# Patient Record
Sex: FEMALE | Race: WHITE | NOT HISPANIC OR LATINO | Employment: OTHER
[De-identification: names, ages, dates, MRNs, and addresses within clinical notes are randomized per-mention and may not be internally consistent; named-entity substitution may affect disease eponyms.]

---

## 2021-02-18 ENCOUNTER — TELEPHONE (OUTPATIENT)
Dept: MEDICAL GROUP | Facility: PHYSICIAN GROUP | Age: 86
End: 2021-02-18

## 2021-02-18 NOTE — TELEPHONE ENCOUNTER
NEW PATIENT VISIT PRE-VISIT PLANNING    1.  EpicCare Patient is checked in Patient Demographics?Yes    2.  Immunizations were updated in Epic using Reconcile Outside Information activity? Yes         3.  Is this appointment scheduled as a Hospital Follow-Up? No    4.  Patient is due for the following Health Maintenance Topics:   Health Maintenance Due   Topic Date Due   • COVID-19 Vaccine (1 of 2) 04/03/1944   • IMM DTaP/Tdap/Td Vaccine (1 - Tdap) 04/03/1947   • PAP SMEAR  04/03/1949   • MAMMOGRAM  04/03/1968   • COLONOSCOPY  04/03/1978   • IMM ZOSTER VACCINES (1 of 2) 04/03/1978   • BONE DENSITY  04/03/1993   • IMM PNEUMOCOCCAL VACCINE: 65+ Years (1 of 1 - PPSV23) 04/03/1993   • IMM INFLUENZA (1) 09/01/2020       5.  Reviewed/Updated the following with patient:       •   Preferred Pharmacy? yes       •   Preferred Lab? yes       •   Preferred Communication? yes       •   Allergies? yes       •   Medications? yes       •   Social History? yes       •   Family History (document living status of immediate family members and if + hx of  cancer, diabetes, hypertension, hyperlipidemia, heart attack, stroke) No    6.  Updated Care Team?       •   DME Company (gait device, O2, CPAP, etc.) N/A       •   Other Specialists (eye doctor, derm, GYN, cardiology, endo, etc): N?A    7.  AHA (Puls[employer]) form printed for Provider? Email sent to [facility] requesting form

## 2021-02-24 ENCOUNTER — OFFICE VISIT (OUTPATIENT)
Dept: MEDICAL GROUP | Facility: PHYSICIAN GROUP | Age: 86
End: 2021-02-24
Payer: MEDICARE

## 2021-02-24 VITALS
OXYGEN SATURATION: 96 % | DIASTOLIC BLOOD PRESSURE: 70 MMHG | SYSTOLIC BLOOD PRESSURE: 140 MMHG | HEIGHT: 71 IN | WEIGHT: 163.1 LBS | TEMPERATURE: 97.5 F | BODY MASS INDEX: 22.83 KG/M2 | HEART RATE: 120 BPM

## 2021-02-24 DIAGNOSIS — R63.4 WEIGHT LOSS: ICD-10-CM

## 2021-02-24 DIAGNOSIS — I48.11 LONGSTANDING PERSISTENT ATRIAL FIBRILLATION (HCC): ICD-10-CM

## 2021-02-24 DIAGNOSIS — H51.8: ICD-10-CM

## 2021-02-24 DIAGNOSIS — G89.29 CHRONIC RIGHT-SIDED LOW BACK PAIN WITHOUT SCIATICA: ICD-10-CM

## 2021-02-24 DIAGNOSIS — Z00.00 HEALTHCARE MAINTENANCE: ICD-10-CM

## 2021-02-24 DIAGNOSIS — H91.93 BILATERAL HEARING LOSS, UNSPECIFIED HEARING LOSS TYPE: ICD-10-CM

## 2021-02-24 DIAGNOSIS — R26.81 GAIT INSTABILITY: ICD-10-CM

## 2021-02-24 DIAGNOSIS — Z71.89 ADVANCE DIRECTIVE DISCUSSED WITH PATIENT: ICD-10-CM

## 2021-02-24 DIAGNOSIS — M54.50 CHRONIC RIGHT-SIDED LOW BACK PAIN WITHOUT SCIATICA: ICD-10-CM

## 2021-02-24 DIAGNOSIS — R00.0 TACHYCARDIA: ICD-10-CM

## 2021-02-24 DIAGNOSIS — Z00.00 WELLNESS EXAMINATION: ICD-10-CM

## 2021-02-24 DIAGNOSIS — Z85.828 HISTORY OF SKIN CANCER: ICD-10-CM

## 2021-02-24 PROBLEM — H91.90 HARD OF HEARING: Status: ACTIVE | Noted: 2021-02-24

## 2021-02-24 PROBLEM — I48.91 ATRIAL FIBRILLATION (HCC): Status: ACTIVE | Noted: 2021-02-24

## 2021-02-24 PROCEDURE — 99204 OFFICE O/P NEW MOD 45 MIN: CPT | Performed by: FAMILY MEDICINE

## 2021-02-24 RX ORDER — ATENOLOL 25 MG/1
25 TABLET ORAL DAILY
Qty: 30 TABLET | Refills: 5 | Status: SHIPPED | OUTPATIENT
Start: 2021-02-24 | End: 2021-04-30 | Stop reason: SDUPTHER

## 2021-02-24 ASSESSMENT — PATIENT HEALTH QUESTIONNAIRE - PHQ9: CLINICAL INTERPRETATION OF PHQ2 SCORE: 0

## 2021-02-24 NOTE — ASSESSMENT & PLAN NOTE
Decades of R LBP, no radiation, no known hx trauma, occurs all day every day, improves w/ sitting, movement helps. Often a 6-7/10, not present right now. Doesn't want to take anything for this. Hx R hip bursitis and had PT then which helped. Has tried PT for her LBP in the past which didn't help.

## 2021-02-24 NOTE — PATIENT INSTRUCTIONS
Let me know if you want a blood thinner like xarelto  We would need to check your kidney function prior with a lab test  Let me know if you want to see cardiology  Enjoy PT  Try 1/2 dose or whole dose of atenolol for better exercise tolerance with walking

## 2021-02-24 NOTE — ASSESSMENT & PLAN NOTE
Moved to [place] 1/[place]. [place] lived [place] for 30y, was in a [facility] home (had her own appt, meals were served) but in 11/20 moved to live with her daughter (was isolated bc of Covid) however that didn't turn out as expected so she moved here to be closer to her daughter Melissa Dixon and lives in a [facility] home here ([facility]). Needs POLST form and HC placard form today.

## 2021-02-24 NOTE — ASSESSMENT & PLAN NOTE
"Has been given short acting and long acting metoprolol in the past, she only takes it if her heart is \"pounding hard.\" hasn't taken it in 2 yrs. Has been recommended to start anticoagulation but she has deferred.   "

## 2021-02-24 NOTE — ASSESSMENT & PLAN NOTE
Lost 20lb due to covid, in part she was lonely and she didn't like the food at her longterm home. Has been a vegetarian for 60 yrs.

## 2021-02-24 NOTE — PROGRESS NOTES
"Subjective:     CC:    Chief Complaint   Patient presents with   • Establish Care     New Patient        HISTORY OF THE PRESENT ILLNESS: Patient is a 89 y.o. female. This pleasant patient is here today to establish care and discuss her routine medical problems, she has no c/o today. [place].    Healthcare maintenance  [place] 1/21 [place]. [place] lived [place] for 30[place], [facility] (had her own appt, meals were served) but in 11/20 moved to live with her daughter (was isolated bc of Covid) however that didn't turn out as expected so she moved here to be closer to her daughter Melissa Dixon and lives in a [facility] home here ([facility]). Needs POLST form and HC placard form today.     Atrial fibrillation (HCC)  Has been given short acting and long acting metoprolol in the past, she only takes it if her heart is \"pounding hard.\" hasn't taken it in 2 yrs. Has been recommended to start anticoagulation but she has deferred.     Right-sided low back pain without sciatica  Decades of R LBP, no radiation, no known hx trauma, occurs all day every day, improves w/ sitting, movement helps. Often a 6-7/10, not present right now. Doesn't want to take anything for this. Hx R hip bursitis and had PT then which helped. Has tried PT for her LBP in the past which didn't help.     Hard of hearing  Wears aides.     Advance directive discussed with patient  Patient here with daughter Melissa Dixon. Has another daughter who she believes is her POA Minal Beth in [place]. She has a POLST form from [place] signed 4/17/17 that she is DNR/DNI and Comfort Measures only. She wants to continue with this and the [place] POLST form was signed today with me. She would be willing to take medicines orally only if they make her more comfortable. She does not want life prolonging medications.     History of skin cancer  Hx BCC, would like a referral to derm.     Weight loss  Lost 20lb due to covid, in part she was lonely and she didn't like " "the food at her [facility] home. Has been a vegetarian for 60 yrs.     Skew deviation of right eye  Deviates upper/lateral. Told by her optho there is nothing to do about it, unsure of etiology.       Allergies: Patient has no known allergies.    Current Outpatient Medications Ordered in Epic   Medication Sig Dispense Refill   • atenolol (TENORMIN) 25 MG Tab Take 1 tablet by mouth every day. 30 tablet 5     No current Epic-ordered facility-administered medications on file.       No past medical history on file.    No past surgical history on file.    Social History     Tobacco Use   • Smoking status: Former Smoker     Packs/day: 0.50     Years: 3.00     Pack years: 1.50     Quit date: 1952     Years since quittin.1   • Smokeless tobacco: Never Used   Substance Use Topics   • Alcohol use: Never   • Drug use: Never       Social History     Social History Narrative   • Not on file       Family History   Problem Relation Age of Onset   • Heart Disease Father    • Hyperlipidemia Father    • Heart Attack Paternal Grandfather        Health Maintenance: Completed    ROS:   See HPI      Objective:     Exam:   /70 (BP Location: Right arm, Patient Position: Sitting, BP Cuff Size: Adult)   Pulse (!) 120   Temp 36.4 °C (97.5 °F) (Temporal)   Ht 1.803 m (5' 11\")   Wt 74 kg (163 lb 1.6 oz)   SpO2 96%   BMI 22.75 kg/m²   Body mass index is 22.75 kg/m².  Wt Readings from Last 4 Encounters:   21 74 kg (163 lb 1.6 oz)     General: Normal appearing. No distress. Appropriately groomed.  HEENT: Normocephalic. Eyes conjunctiva clear lids without ptosis, pupils equal and reactive to light accommodation, R eye deviated superior/lateral, ears normal shape and contour, canals are clear bilaterally, tympanic membranes are benign, nasal mucosa benign, oropharynx is without erythema, edema or exudates.   Neck: Supple without JVD or bruit. Thyroid is not enlarged.   Pulmonary: Clear to ausculation.  Normal effort. No " rales, ronchi, or wheezing.  Cardiovascular: Regular rate and rhythm without murmur. Carotid and radial pulses are intact and equal bilaterally.  Abdomen: Soft, nontender, nondistended. Normal bowel sounds. Liver and spleen are not palpable  Neurologic: Grossly nonfocal  Lymph: No cervical or supraclavicular lymph nodes are palpable  Skin: Warm and dry.  No obvious lesions.  Musculoskeletal: abNormal gait. No extremity cyanosis, clubbing, or edema.  I watched her walk up and down her clinic hallway.  Her gait is mildly unstable, she seems to have weakness in her right gluteus medius as her right leg swings a little medially unintentionally.  She is not able to stand up without the use of her hands.  Psych: Normal mood and affect. Alert and oriented x3. Judgment and insight is normal.      Assessment & Plan:   89 y.o. female with the following -    I spent a long time talking with the patient and her daughter about her CHADS2 score and risk of stroke due to her untreated A. fib and choosing not to use anticoagulation.  I worked to understand what her goals are and her preferences include being treated at home as much as possible and avoiding ER visits and hospital stays if possible.  She wanted to continue to be comfort measures only on her POLST form but stated that if she had a hip fracture she would likely want to have it repaired surgically due to improved outcomes with aggressive treatment.    She does not want an echocardiogram at this time and does not want to start anticoagulation but she is willing to try a low-dose of a beta-blocker as it might improve her exercise tolerance with walking with her daughter which is something she enjoys.  She will let me know if she has any problems with this.  She did want a referral to see the dermatologist due to her prior skin cancers.  She would like to see me back in 6 months and an appointment was arranged.  I filled out her POLST form and had it scanned into the  chart.    1. Tachycardia  - EKG - Clinic Performed    2. Healthcare maintenance    3. Longstanding persistent atrial fibrillation (HCC)    4. Chronic right-sided low back pain without sciatica    5. Bilateral hearing loss, unspecified hearing loss type    6. Advance directive discussed with patient    7. History of skin cancer  - REFERRAL TO DERMATOLOGY    8. Weight loss    9. Skew deviation of right eye    10. Gait instability  - REFERRAL TO PHYSICAL THERAPY    Other orders  - atenolol (TENORMIN) 25 MG Tab; Take 1 tablet by mouth every day.  Dispense: 30 tablet; Refill: 5       Return in about 6 months (around 8/24/2021).    Please note that this dictation was created using voice recognition software. I have made every reasonable attempt to correct obvious errors, but I expect that there are errors of grammar and possibly content that I did not discover before finalizing the note.

## 2021-02-24 NOTE — ASSESSMENT & PLAN NOTE
Patient here with daughter Melissa Dixon. Has another daughter who she believes is her POA Minal Beth in [place]. She has a POLST form from [place] signed 4/17/17 that she is DNR/DNI and Comfort Measures only. She wants to continue with this and the [place] POLST form was signed today with me. She would be willing to take medicines orally only if they make her more comfortable. She does not want life prolonging medications.

## 2021-04-30 ENCOUNTER — HOSPITAL ENCOUNTER (OUTPATIENT)
Facility: MEDICAL CENTER | Age: 86
End: 2021-04-30
Attending: FAMILY MEDICINE
Payer: MEDICARE

## 2021-04-30 ENCOUNTER — OFFICE VISIT (OUTPATIENT)
Dept: MEDICAL GROUP | Facility: PHYSICIAN GROUP | Age: 86
End: 2021-04-30
Payer: MEDICARE

## 2021-04-30 VITALS
DIASTOLIC BLOOD PRESSURE: 64 MMHG | SYSTOLIC BLOOD PRESSURE: 110 MMHG | RESPIRATION RATE: 15 BRPM | WEIGHT: 160.9 LBS | HEIGHT: 71 IN | HEART RATE: 88 BPM | BODY MASS INDEX: 22.52 KG/M2 | OXYGEN SATURATION: 95 % | TEMPERATURE: 97.6 F

## 2021-04-30 DIAGNOSIS — R79.9 ABNORMAL FINDING OF BLOOD CHEMISTRY, UNSPECIFIED: ICD-10-CM

## 2021-04-30 DIAGNOSIS — H57.11 PAIN OF RIGHT EYE: ICD-10-CM

## 2021-04-30 DIAGNOSIS — H51.8: ICD-10-CM

## 2021-04-30 DIAGNOSIS — K59.00 CONSTIPATION, UNSPECIFIED CONSTIPATION TYPE: ICD-10-CM

## 2021-04-30 DIAGNOSIS — I48.11 LONGSTANDING PERSISTENT ATRIAL FIBRILLATION (HCC): ICD-10-CM

## 2021-04-30 DIAGNOSIS — M79.671 RIGHT FOOT PAIN: ICD-10-CM

## 2021-04-30 DIAGNOSIS — Z11.59 NEED FOR HEPATITIS C SCREENING TEST: ICD-10-CM

## 2021-04-30 PROCEDURE — 83036 HEMOGLOBIN GLYCOSYLATED A1C: CPT

## 2021-04-30 PROCEDURE — 82607 VITAMIN B-12: CPT

## 2021-04-30 PROCEDURE — 82746 ASSAY OF FOLIC ACID SERUM: CPT

## 2021-04-30 PROCEDURE — 85025 COMPLETE CBC W/AUTO DIFF WBC: CPT

## 2021-04-30 PROCEDURE — 83550 IRON BINDING TEST: CPT

## 2021-04-30 PROCEDURE — 83540 ASSAY OF IRON: CPT

## 2021-04-30 PROCEDURE — 86803 HEPATITIS C AB TEST: CPT

## 2021-04-30 PROCEDURE — 80053 COMPREHEN METABOLIC PANEL: CPT

## 2021-04-30 PROCEDURE — 84443 ASSAY THYROID STIM HORMONE: CPT

## 2021-04-30 PROCEDURE — 99214 OFFICE O/P EST MOD 30 MIN: CPT | Performed by: FAMILY MEDICINE

## 2021-04-30 PROCEDURE — 82728 ASSAY OF FERRITIN: CPT

## 2021-04-30 RX ORDER — ATENOLOL 25 MG/1
12.5 TABLET ORAL DAILY
Qty: 45 TABLET | Refills: 5
Start: 2021-04-30 | End: 2021-12-02

## 2021-04-30 NOTE — NON-PROVIDER
Pt was seated, confirmed pt name and , procedure explained to pt, venipuncture performed in RAC, using aseptic technique, 4 gold, 1 green, 2 lavender tubes collected, gauze placed with pressure on venipuncture site until hemostasis observed, site clean and dry, no redness observed. Slight swelling (1mm), monitored for 5 minutes, no increase. Advised pt to apply ice if painful. Pressure bandage placed, pt tolerated well and voiced no concerns.

## 2021-04-30 NOTE — PROGRESS NOTES
"Subjective:     CC:   Chief Complaint   Patient presents with   • Foot Pain     Outside of the right foot, Pain for 6 weeks    • Eye Problem     Experiencing pain in her right eye          HPI:   Daphne presents today with :  A few days after a PT session on her back (felt like a massage to help loosen her muscles she experienced a severe pain in the outside of her R foot.  Felt like she had been zapped by a live wire, lasted a few minutes. Can't do anything to help it, has recurred about every couple of days. Might be 1 to a few times each episode.  Doesn't feel like a muscle cramp.   She let her PT know who checked her muscles and didn't see anything abnormal. Also, doesn't have any issues in her legs.   Problem   Constipation    Uses miralax prn and adds fiber to her cereal daily. Ok to use miralax daily.      Pain of Right Eye    Has had pain in this eye for \"quite awhile,\" daughter thinks for 10yr or so. Needs a local optho. No new vision or eye complaints.      Atrial Fibrillation (Hcc)    Has deferred any treatment of this and understands the risk of clots/stroke. I offered aspirin or xarelto/eliquis and she deferred. Has also deferred a referral to cardiology.   Stable, continue current plan of care       Skew Deviation of Right Eye    Has a long h/o this. Doesn't know why, has had it evaluated by prior optho. Hx cataract in that eye.          Current Outpatient Medications Ordered in Epic   Medication Sig Dispense Refill   • atenolol (TENORMIN) 25 MG Tab Take 0.5 Tablets by mouth every day. 45 tablet 5     No current Epic-ordered facility-administered medications on file.       No past medical history on file.    Social History     Tobacco Use   • Smoking status: Former Smoker     Packs/day: 0.50     Years: 3.00     Pack years: 1.50     Quit date: 1952     Years since quittin.3   • Smokeless tobacco: Never Used   Substance Use Topics   • Alcohol use: Never   • Drug use: Never " "      Allergies:  Patient has no known allergies.    Health Maintenance: Completed    ROS:  Per HPI      Objective:       Exam:  /64 (BP Location: Left arm, Patient Position: Sitting, BP Cuff Size: Adult)   Pulse 88   Temp 36.4 °C (97.6 °F) (Temporal)   Resp 15   Ht 1.803 m (5' 11\")   Wt 73 kg (160 lb 14.4 oz)   SpO2 95%   BMI 22.44 kg/m²   Body mass index is 22.44 kg/m².  Wt Readings from Last 4 Encounters:   04/30/21 73 kg (160 lb 14.4 oz)   02/24/21 74 kg (163 lb 1.6 oz)       Gen: Alert and oriented, No apparent distress. Appropriately groomed.  Neck: Neck is supple without lymphadenopathy.No thyromegaly.   Lungs: Normal effort, CTA bilaterally, no wheezes, rhonchi, or rales.  CV: irRegular rate and rhythm. No murmurs, rubs, or gallops.  ABD:  Soft, nontender, nondistended, NABSx4, no HSM or RBT or guarding or masses.  Ext: No clubbing, cyanosis, L>R 1+ pitting pretib edema. Bilateral ankles w/ dry skin, wnl ROM, no ttp. No ankle instability.   Skin: No rash noted.  Monofilament testing with a 10 gram force: sensation intact: intact bilaterally  Visual Inspection: Feet without maceration, ulcers, fissures.  Pedal pulses: intact bilaterally    Labs: obtain now, non fasting    Assessment & Plan:     89 y.o. female with the following -   Defers referral to podiatry  rec compression hose for bilat LE edema  defers cardiology and u/s of LLE for eval to r/o DVT  Add miralax for constipation  Obtain last optho records, saline eye gtt prn    1. Constipation, unspecified constipation type    2. Longstanding persistent atrial fibrillation (HCC)  - TSH; Future    3. Right foot pain  - Comp Metabolic Panel; Future  - HEMOGLOBIN A1C; Future  - VITAMIN B12; Future  - FOLATE; Future  - FERRITIN; Future  - IRON/TOTAL IRON BIND; Future  - CBC WITH DIFFERENTIAL; Future    4. Need for hepatitis C screening test  - HEP C VIRUS ANTIBODY; Future    5. Abnormal finding of blood chemistry, unspecified   - HEMOGLOBIN A1C; " Future  - FERRITIN; Future  - IRON/TOTAL IRON BIND; Future    6. Skew deviation of right eye  - REFERRAL TO OPHTHALMOLOGY    7. Pain of right eye  - REFERRAL TO OPHTHALMOLOGY    Other orders  - atenolol (TENORMIN) 25 MG Tab; Take 0.5 Tablets by mouth every day.  Dispense: 45 tablet; Refill: 5        Please note that this dictation was created using voice recognition software. I have made every reasonable attempt to correct obvious errors, but I expect that there are errors of grammar and possibly content that I did not discover before finalizing the note.

## 2021-05-01 LAB
ALBUMIN SERPL BCP-MCNC: 4.3 G/DL (ref 3.2–4.9)
ALBUMIN/GLOB SERPL: 1.5 G/DL
ALP SERPL-CCNC: 65 U/L (ref 30–99)
ALT SERPL-CCNC: 19 U/L (ref 2–50)
ANION GAP SERPL CALC-SCNC: 8 MMOL/L (ref 7–16)
AST SERPL-CCNC: 28 U/L (ref 12–45)
BASOPHILS # BLD AUTO: 0.8 % (ref 0–1.8)
BASOPHILS # BLD: 0.06 K/UL (ref 0–0.12)
BILIRUB SERPL-MCNC: 0.5 MG/DL (ref 0.1–1.5)
BUN SERPL-MCNC: 40 MG/DL (ref 8–22)
CALCIUM SERPL-MCNC: 9.5 MG/DL (ref 8.5–10.5)
CHLORIDE SERPL-SCNC: 104 MMOL/L (ref 96–112)
CO2 SERPL-SCNC: 24 MMOL/L (ref 20–33)
CREAT SERPL-MCNC: 1.29 MG/DL (ref 0.5–1.4)
EOSINOPHIL # BLD AUTO: 0.2 K/UL (ref 0–0.51)
EOSINOPHIL NFR BLD: 2.7 % (ref 0–6.9)
ERYTHROCYTE [DISTWIDTH] IN BLOOD BY AUTOMATED COUNT: 48.7 FL (ref 35.9–50)
EST. AVERAGE GLUCOSE BLD GHB EST-MCNC: 111 MG/DL
FERRITIN SERPL-MCNC: 167 NG/ML (ref 10–291)
FOLATE SERPL-MCNC: 15.1 NG/ML
GLOBULIN SER CALC-MCNC: 2.8 G/DL (ref 1.9–3.5)
GLUCOSE SERPL-MCNC: 81 MG/DL (ref 65–99)
HBA1C MFR BLD: 5.5 % (ref 4–5.6)
HCT VFR BLD AUTO: 41.8 % (ref 37–47)
HCV AB SER QL: NORMAL
HGB BLD-MCNC: 13.3 G/DL (ref 12–16)
IMM GRANULOCYTES # BLD AUTO: 0.04 K/UL (ref 0–0.11)
IMM GRANULOCYTES NFR BLD AUTO: 0.5 % (ref 0–0.9)
IRON SATN MFR SERPL: 33 % (ref 15–55)
IRON SERPL-MCNC: 102 UG/DL (ref 40–170)
LYMPHOCYTES # BLD AUTO: 2.31 K/UL (ref 1–4.8)
LYMPHOCYTES NFR BLD: 31.7 % (ref 22–41)
MCH RBC QN AUTO: 30 PG (ref 27–33)
MCHC RBC AUTO-ENTMCNC: 31.8 G/DL (ref 33.6–35)
MCV RBC AUTO: 94.4 FL (ref 81.4–97.8)
MONOCYTES # BLD AUTO: 0.63 K/UL (ref 0–0.85)
MONOCYTES NFR BLD AUTO: 8.6 % (ref 0–13.4)
NEUTROPHILS # BLD AUTO: 4.05 K/UL (ref 2–7.15)
NEUTROPHILS NFR BLD: 55.7 % (ref 44–72)
NRBC # BLD AUTO: 0 K/UL
NRBC BLD-RTO: 0 /100 WBC
PLATELET # BLD AUTO: 230 K/UL (ref 164–446)
PMV BLD AUTO: 12.4 FL (ref 9–12.9)
POTASSIUM SERPL-SCNC: 4.8 MMOL/L (ref 3.6–5.5)
PROT SERPL-MCNC: 7.1 G/DL (ref 6–8.2)
RBC # BLD AUTO: 4.43 M/UL (ref 4.2–5.4)
SODIUM SERPL-SCNC: 136 MMOL/L (ref 135–145)
TIBC SERPL-MCNC: 309 UG/DL (ref 250–450)
TSH SERPL DL<=0.005 MIU/L-ACNC: 4.14 UIU/ML (ref 0.38–5.33)
UIBC SERPL-MCNC: 207 UG/DL (ref 110–370)
VIT B12 SERPL-MCNC: 572 PG/ML (ref 211–911)
WBC # BLD AUTO: 7.3 K/UL (ref 4.8–10.8)

## 2021-05-04 ENCOUNTER — TELEPHONE (OUTPATIENT)
Dept: MEDICAL GROUP | Facility: PHYSICIAN GROUP | Age: 86
End: 2021-05-04

## 2021-05-04 DIAGNOSIS — M79.671 RIGHT FOOT PAIN: ICD-10-CM

## 2021-05-04 DIAGNOSIS — N28.9 RENAL INSUFFICIENCY: ICD-10-CM

## 2021-05-04 NOTE — TELEPHONE ENCOUNTER
1. Caller Name: Becky Dixon                         Call Back Number: [phone] (home)       How would the patient prefer to be contacted with a response: Phone call OK to leave a detailed message    Patients Daughter called and asked if Mom could get a referral to Podiatry for Dr. Avila

## 2021-05-04 NOTE — TELEPHONE ENCOUNTER
Excruciating Pain in her Right foot. Started 6 Weeks Ago   Says pain happens out of know where and that she doesn't do anything to cause it.

## 2021-05-06 ENCOUNTER — PATIENT MESSAGE (OUTPATIENT)
Dept: MEDICAL GROUP | Facility: PHYSICIAN GROUP | Age: 86
End: 2021-05-06

## 2021-05-06 ENCOUNTER — TELEPHONE (OUTPATIENT)
Dept: MEDICAL GROUP | Facility: PHYSICIAN GROUP | Age: 86
End: 2021-05-06

## 2021-05-06 NOTE — TELEPHONE ENCOUNTER
Thank you, I would reiterate what I told her already in my [employer] message.  If she has any old lab work that she can submit either bringing us a copy faxing it or submitting it via [employer] that would be helpful.  Otherwise I have no new information.  If she wants to come see me in the office to discuss it further she is welcome to.

## 2021-05-06 NOTE — TELEPHONE ENCOUNTER
1. Caller Name: Daphne Moss                         Call Back Number: [phone] (home)       How would the patient prefer to be contacted with a response: Phone call do NOT leave a detailed message    Patient LVM regarding Lab work for Comp Metabolic Panel. Patient is concerned with her BUN levels. States shes usually at 20-27 and is concerned as to why it's 40. Wants to know if its a safe range? Also her GFR was out of range and would like more information on it.

## 2021-05-12 ENCOUNTER — PATIENT MESSAGE (OUTPATIENT)
Dept: HEALTH INFORMATION MANAGEMENT | Facility: OTHER | Age: 86
End: 2021-05-12

## 2021-05-15 NOTE — PATIENT COMMUNICATION
Phone Number Called: [phone] (home)       Call outcome: Spoke to patient regarding message below.    Message: Notified pt that problems regarding MyChart and medical record reconciliation can be discussed at next appt.    Pt stated that her last Opthamologist was Dr Nicholas Bateman at [facility] in [place], [place] (Phone #: [phone]).

## 2021-05-15 NOTE — TELEPHONE ENCOUNTER
Phone Number Called: [phone]    Call outcome:  Practice closed    Message: Attempted to call [facility] to confirm pt service(s), but automated response stated that clinic had closed at 5.

## 2021-05-18 ENCOUNTER — PATIENT MESSAGE (OUTPATIENT)
Dept: ADMINISTRATIVE | Facility: MEDICAL CENTER | Age: 86
End: 2021-05-18

## 2021-06-16 ENCOUNTER — PATIENT MESSAGE (OUTPATIENT)
Dept: HEALTH INFORMATION MANAGEMENT | Facility: OTHER | Age: 86
End: 2021-06-16

## 2021-07-26 ENCOUNTER — OFFICE VISIT (OUTPATIENT)
Dept: URGENT CARE | Facility: CLINIC | Age: 86
End: 2021-07-26
Payer: MEDICARE

## 2021-07-26 VITALS
TEMPERATURE: 98.5 F | RESPIRATION RATE: 18 BRPM | OXYGEN SATURATION: 96 % | HEIGHT: 71 IN | WEIGHT: 150 LBS | DIASTOLIC BLOOD PRESSURE: 74 MMHG | SYSTOLIC BLOOD PRESSURE: 116 MMHG | HEART RATE: 83 BPM | BODY MASS INDEX: 21 KG/M2

## 2021-07-26 DIAGNOSIS — L97.321 VENOUS STASIS ULCER OF LEFT ANKLE LIMITED TO BREAKDOWN OF SKIN WITHOUT VARICOSE VEINS (HCC): ICD-10-CM

## 2021-07-26 DIAGNOSIS — I87.2 VENOUS STASIS ULCER OF LEFT ANKLE LIMITED TO BREAKDOWN OF SKIN WITHOUT VARICOSE VEINS (HCC): ICD-10-CM

## 2021-07-26 PROBLEM — M18.11 ARTHRITIS OF CARPOMETACARPAL (CMC) JOINT OF RIGHT THUMB: Status: ACTIVE | Noted: 2018-06-28

## 2021-07-26 PROBLEM — Z87.891 FORMER SMOKER: Status: ACTIVE | Noted: 2017-07-13

## 2021-07-26 PROBLEM — M54.31 RIGHT SIDED SCIATICA: Status: ACTIVE | Noted: 2020-11-03

## 2021-07-26 PROBLEM — M85.80 OSTEOPENIA: Status: ACTIVE | Noted: 2018-05-31

## 2021-07-26 PROBLEM — N28.9 RENAL INSUFFICIENCY SYNDROME: Status: ACTIVE | Noted: 2018-05-31

## 2021-07-26 PROCEDURE — 99203 OFFICE O/P NEW LOW 30 MIN: CPT | Performed by: PHYSICIAN ASSISTANT

## 2021-07-26 ASSESSMENT — ENCOUNTER SYMPTOMS
COUGH: 0
VOMITING: 0
SHORTNESS OF BREATH: 0
HEADACHES: 0
EYE DISCHARGE: 0
FEVER: 0
SORE THROAT: 0
NAUSEA: 0
EYE REDNESS: 0

## 2021-07-26 ASSESSMENT — FIBROSIS 4 INDEX: FIB4 SCORE: 2.6

## 2021-07-26 NOTE — PROGRESS NOTES
Subjective:      Daphne Moss is a 89 y.o. female who presents with Leg Injury (x this morning L leg above foot leaking clear fluid)          This is a new problem.  The patient presents to clinic accompanied by her daughter complaining of weeping to the left lower leg/ankle onset earlier today.  The patient states that she noticed an area of weeping to the left lower leg near the left ankle.  The patient reports no recent injury or trauma to her left lower leg.  She reports no active bleeding.  The patient describes the weeping as clear fluid.  The patient reports a history of swelling to her bilateral lower extremities, especially the bilateral feet and ankles.  The patient states the swelling is often worse in the summer due to the heat.  The patient reports no increased swelling of her left lower extremity.  The patient reports no pain to the affected area.  She also reports no pain/tenderness to the left lower extremity, specifically the left calf.  The patient reports no associated redness.  No increased warmth.  She also reports no associated fever.  The patient states she is feeling well otherwise.  The patient reports no history of same.  The patient has not taken any OTC medications for her current symptoms.      PMH:  has no past medical history on file.  MEDS:   Current Outpatient Medications:   •  atenolol (TENORMIN) 25 MG Tab, Take 0.5 Tablets by mouth every day., Disp: 45 tablet, Rfl: 5  •  Cholecalciferol 1000 UNIT Cap, Take 1,000 Units by mouth every day., Disp: , Rfl:   ALLERGIES: No Known Allergies  SURGHX: No past surgical history on file.  SOCHX:  reports that she quit smoking about 69 years ago. She has a 1.50 pack-year smoking history. She has never used smokeless tobacco. She reports that she does not drink alcohol and does not use drugs.  FH: Family history was reviewed, no pertinent findings to report    Review of Systems   Constitutional: Negative for fever.   HENT: Negative for congestion,  "ear pain and sore throat.    Eyes: Negative for discharge and redness.   Respiratory: Negative for cough and shortness of breath.    Cardiovascular: Negative for chest pain and leg swelling.   Gastrointestinal: Negative for nausea and vomiting.   Musculoskeletal: Negative for joint pain.   Skin: Negative for rash.   Neurological: Negative for headaches.          Objective:     /74 (BP Location: Left arm, Patient Position: Sitting, BP Cuff Size: Adult)   Pulse 83   Temp 36.9 °C (98.5 °F) (Temporal)   Resp 18   Ht 1.803 m (5' 11\")   Wt 68 kg (150 lb)   SpO2 96%   BMI 20.92 kg/m²      Physical Exam  Constitutional:       General: She is not in acute distress.     Appearance: Normal appearance. She is well-developed. She is not ill-appearing.   HENT:      Head: Normocephalic and atraumatic.      Right Ear: External ear normal.      Left Ear: External ear normal.   Eyes:      Extraocular Movements: Extraocular movements intact.      Conjunctiva/sclera: Conjunctivae normal.   Cardiovascular:      Rate and Rhythm: Normal rate.   Pulmonary:      Effort: Pulmonary effort is normal.   Musculoskeletal:      Cervical back: Normal range of motion and neck supple.      Comments:   Left Lower Extremity:   Single venous stasis ulcer to the distal anterior aspect of the left lower leg/ankle with active weeping of serous fluid.  Venous stasis changes to the left lower leg.  No tenderness to palpation surrounding the ulcer.  No tenderness to the left lower leg.  No calf tenderness.  Trace swelling to the left ankle.  No swelling to the left lower leg.  No palpable cords.  No erythema surrounding the venous stasis ulcer.  No increased warmth.  No purulent discharge/drainage.  No secondary signs of infection.  No erythema to the left lower leg.  No increased warmth.  No additional wounds/lesions.  ROM intact -the patient demonstrates full active range of motion of the left ankle/foot  Neurovascular intact distally  Strength " 5/5 -dorsiflexion/plantarflexion of the left ankle/foot against resistance  Normal gait   Skin:     General: Skin is warm and dry.   Neurological:      Mental Status: She is alert and oriented to person, place, and time.              Progress:  Applied a dressing to the patient's venous stasis ulcer with an Ace wrap providing slight compression to the left lower leg.  Educated the patient on proper wound care.  Advised patient to monitor for signs of infection.     Assessment/Plan:          1. Venous stasis ulcer of left ankle limited to breakdown of skin without varicose veins (HCC)    The patient's presenting symptoms and physical exam findings are consistent with a venous stasis ulcer of the left ankle.  The patient presents to clinic due to continued weeping of clear fluid to the left ankle.  On physical exam, the patient had a single venous stasis ulcer to the distal anterior aspect of the left lower leg/ankle with active weeping of serous fluid.  The patient had chronic venous stasis changes to the left lower leg.  The patient had no tenderness to palpation surrounding the ulcer.  The patient also had no tenderness to the left lower leg, specifically no calf tenderness.  Additionally, the patient had trace swelling to the left ankle likely secondary to dependent edema.  The patient had no diffuse swelling of the left lower leg.  No palpable cords were appreciated.  The patient had no signs of secondary infection with no erythema, increased warmth, or purulent discharge/drainage.  The remainder the patient's physical exam today in clinic was normal.  The patient appears in no acute distress.  The patient's vital signs are stable and within normal limits.  She is afebrile today in clinic.  Based on the patient's presenting symptoms and physical exam findings, it is unlikely the patient's venous stasis ulcer is secondarily infected at this time.  Advised patient to monitor for signs of infection.  Applied a  dressing to the patient's venous stasis ulcer with an Ace wrap providing slight compression to the left lower leg.  Educated patient on proper wound care.  Recommend patient follow-up with her PCP.  Discussed return precautions with the patient, and she verbalized understanding.    Differential diagnoses, supportive care, and indications for immediate follow-up discussed with patient.   Instructed to return to clinic or nearest emergency department for any change in condition, further concerns, or worsening of symptoms.    OTC Tylenol for fever/discomfort.  RICE  --Advised the patient compression stockings can help with dependent edema to the lower extremities  Monitor for signs of infection  Follow-up with PCP  Return to clinic or go to the ED if symptoms worsen or fail to improve, or if the patient should develop worsening/increasing/persistent wound to the left lower leg/ankle, persistent weeping, pain/tenderness, swelling, increased redness or warmth, purulent discharge/drainage, fever/chills, secondary signs of infection, and/or any concerning symptoms.    Discussed plan with the patient, and she agrees to the above.     I personally reviewed prior external notes and test results pertinent to today's visit.  I have independently reviewed and interpreted all diagnostics ordered during this urgent care visit.     Time spent evaluating this patient was at least 30 minutes and includes preparing for visit, obtaining history, exam and evaluation, ordering labs/tests/procedures/medications, independent interpretation, and counseling/education.    Please note that this dictation was created using voice recognition software. I have made every reasonable attempt to correct obvious errors, but I expect that there may be errors of grammar and possibly content that I did not discover before finalizing the note.     This note was electronically signed by Iza Artis PA-C

## 2021-08-11 ENCOUNTER — OFFICE VISIT (OUTPATIENT)
Dept: MEDICAL GROUP | Facility: PHYSICIAN GROUP | Age: 86
End: 2021-08-11
Payer: MEDICARE

## 2021-08-11 VITALS
DIASTOLIC BLOOD PRESSURE: 78 MMHG | RESPIRATION RATE: 16 BRPM | SYSTOLIC BLOOD PRESSURE: 122 MMHG | TEMPERATURE: 98.5 F | BODY MASS INDEX: 21.56 KG/M2 | HEIGHT: 71 IN | HEART RATE: 98 BPM | WEIGHT: 154 LBS | OXYGEN SATURATION: 95 %

## 2021-08-11 DIAGNOSIS — K59.00 CONSTIPATION, UNSPECIFIED CONSTIPATION TYPE: ICD-10-CM

## 2021-08-11 DIAGNOSIS — M54.50 CHRONIC RIGHT-SIDED LOW BACK PAIN WITHOUT SCIATICA: ICD-10-CM

## 2021-08-11 DIAGNOSIS — G89.29 CHRONIC RIGHT-SIDED LOW BACK PAIN WITHOUT SCIATICA: ICD-10-CM

## 2021-08-11 DIAGNOSIS — Z71.89 ADVANCE DIRECTIVE DISCUSSED WITH PATIENT: ICD-10-CM

## 2021-08-11 DIAGNOSIS — I48.11 LONGSTANDING PERSISTENT ATRIAL FIBRILLATION (HCC): ICD-10-CM

## 2021-08-11 PROCEDURE — 99212 OFFICE O/P EST SF 10 MIN: CPT | Performed by: FAMILY MEDICINE

## 2021-08-11 ASSESSMENT — FIBROSIS 4 INDEX: FIB4 SCORE: 2.6

## 2021-08-11 NOTE — PROGRESS NOTES
Subjective:     CC:   Chief Complaint   Patient presents with   • Back Pain     px is not getting any better          HPI:   Daphne presents today with follow-up from our last April 30 visit  At that time I suggested she try compression hose for her bilateral lower extremity edema.  She had deferred a referral to cardiology or an ultrasound of her left lower extremity to rule out a DVT.  She had described severe pain on the outside of her right foot after PT session that felt like an exact byline fire and lasted a few minutes, I have not found anything abnormal on her exam and she deferred a referral to podiatry.   I also suggested she add MiraLAX for constipation. Her constipation has resolved and she hasnt had any further issues w/  Her foot. Continues w/ back pain on occasion, hinders her from walking longer distances, did PT twice w/o help. Doesn't take anything.   Has gained 4lb since last visit in EMR 7/26.   Has mild mac degen. Affects vision for small print.     Problem   Atrial Fibrillation (Hcc)    Has deferred any treatment of this and understands the risk of clots/stroke. I offered aspirin or xarelto/eliquis and she deferred. Has also deferred a referral to cardiology.   We re-visited the issue 08/11/21 and again I explained to her the risk of stroke by not taking a blood thinner and she expresses understanding but continues to defer a rx or eval. She states she doesn't like taking pills.        Advance Directive Discussed With Patient    Patient here with daughter Melissa Dixon. Has another daughter who she believes is her POA M[place] Beth in [place]. She has a [place] form from [place] signed 4/17/17 that she is DNR/DNI and Comfort Measures only. She wants to continue with this and the [place] POLST form was signed 2/21 with me. She would be willing to take medicines orally only if they make her more comfortable. She does not want life prolonging medications.          Current Outpatient Medications Ordered in Epic  "  Medication Sig Dispense Refill   • atenolol (TENORMIN) 25 MG Tab Take 0.5 Tablets by mouth every day. 45 tablet 5   • Cholecalciferol 1000 UNIT Cap Take 1,000 Units by mouth every day.       No current [facility]-ordered facility-administered medications on file.       History reviewed. No pertinent past medical history.    Social History     Tobacco Use   • Smoking status: Former Smoker     Packs/day: 0.50     Years: 3.00     Pack years: 1.50     Quit date: 1952     Years since quittin.6   • Smokeless tobacco: Never Used   Vaping Use   • Vaping Use: Never used   Substance Use Topics   • Alcohol use: Never   • Drug use: Never       Allergies:  Patient has no known allergies.    Health Maintenance: Completed  Had tdap when broke her arm around 12-14y ago. Defers that.   As well as pcv 23 or shingrix.   ROS:  Per HPI  Energy is fine, no cp/sob/cough. No abd pain. No falls.       Objective:       Exam:  /78 (BP Location: Left arm, Patient Position: Sitting, BP Cuff Size: Adult)   Pulse 98   Temp 36.9 °C (98.5 °F) (Temporal)   Resp 16   Ht 1.803 m (5' 11\")   Wt 69.9 kg (154 lb)   SpO2 95%   BMI 21.48 kg/m²   Body mass index is 21.48 kg/m².  Wt Readings from Last 4 Encounters:   21 69.9 kg (154 lb)   21 68 kg (150 lb)   21 73 kg (160 lb 14.4 oz)   21 74 kg (163 lb 1.6 oz)       Gen: Alert and oriented, No apparent distress. Appropriately groomed.  Neck: Neck is supple without lymphadenopathy.No thyromegaly.   Lungs: Normal effort, CTA bilaterally, no wheezes, rhonchi, or rales.  CV: irRegular rhythm, normal rate. No murmurs, rubs, or gallops.  ABD:  Soft, nontender, nondistended, NABSx4, no HSM or RBT or guarding or masses.  Ext: No clubbing, cyanosis, edema.  Skin: No rash noted.    Results for orders placed or performed during the hospital encounter of 21   CBC WITH DIFFERENTIAL   Result Value Ref Range    WBC 7.3 4.8 - 10.8 K/uL    RBC 4.43 4.20 - 5.40 M/uL    Hemoglobin " 13.3 12.0 - 16.0 g/dL    Hematocrit 41.8 37.0 - 47.0 %    MCV 94.4 81.4 - 97.8 fL    MCH 30.0 27.0 - 33.0 pg    MCHC 31.8 (L) 33.6 - 35.0 g/dL    RDW 48.7 35.9 - 50.0 fL    Platelet Count 230 164 - 446 K/uL    MPV 12.4 9.0 - 12.9 fL    Neutrophils-Polys 55.70 44.00 - 72.00 %    Lymphocytes 31.70 22.00 - 41.00 %    Monocytes 8.60 0.00 - 13.40 %    Eosinophils 2.70 0.00 - 6.90 %    Basophils 0.80 0.00 - 1.80 %    Immature Granulocytes 0.50 0.00 - 0.90 %    Nucleated RBC 0.00 /100 WBC    Neutrophils (Absolute) 4.05 2.00 - 7.15 K/uL    Lymphs (Absolute) 2.31 1.00 - 4.80 K/uL    Monos (Absolute) 0.63 0.00 - 0.85 K/uL    Eos (Absolute) 0.20 0.00 - 0.51 K/uL    Baso (Absolute) 0.06 0.00 - 0.12 K/uL    Immature Granulocytes (abs) 0.04 0.00 - 0.11 K/uL    NRBC (Absolute) 0.00 K/uL   IRON/TOTAL IRON BIND   Result Value Ref Range    Iron 102 40 - 170 ug/dL    Total Iron Binding 309 250 - 450 ug/dL    Unsat Iron Binding 207 110 - 370 ug/dL    % Saturation 33 15 - 55 %   FERRITIN   Result Value Ref Range    Ferritin 167.0 10.0 - 291.0 ng/mL   FOLATE   Result Value Ref Range    Folate -Folic Acid 15.1 >4.0 ng/mL   HEMOGLOBIN A1C   Result Value Ref Range    Glycohemoglobin 5.5 4.0 - 5.6 %    Est Avg Glucose 111 mg/dL   TSH   Result Value Ref Range    TSH 4.140 0.380 - 5.330 uIU/mL   Comp Metabolic Panel   Result Value Ref Range    Sodium 136 135 - 145 mmol/L    Potassium 4.8 3.6 - 5.5 mmol/L    Chloride 104 96 - 112 mmol/L    Co2 24 20 - 33 mmol/L    Anion Gap 8.0 7.0 - 16.0    Glucose 81 65 - 99 mg/dL    Bun 40 (H) 8 - 22 mg/dL    Creatinine 1.29 0.50 - 1.40 mg/dL    Calcium 9.5 8.5 - 10.5 mg/dL    AST(SGOT) 28 12 - 45 U/L    ALT(SGPT) 19 2 - 50 U/L    Alkaline Phosphatase 65 30 - 99 U/L    Total Bilirubin 0.5 0.1 - 1.5 mg/dL    Albumin 4.3 3.2 - 4.9 g/dL    Total Protein 7.1 6.0 - 8.2 g/dL    Globulin 2.8 1.9 - 3.5 g/dL    A-G Ratio 1.5 g/dL   VITAMIN B12   Result Value Ref Range    Vitamin B12 -True Cobalamin 574 926 - 151  pg/mL   HEP C VIRUS ANTIBODY   Result Value Ref Range    Hepatitis C Antibody Non-Reactive Non-Reactive   ESTIMATED GFR   Result Value Ref Range    GFR If  47 (A) >60 mL/min/1.73 m 2    GFR If Non  39 (A) >60 mL/min/1.73 m 2   compared to8/18/21 gfr 39.     Assessment & Plan:     89 y.o. female with the following -   ckd stable, no need to repeat.   She defers blood thinner  Can add tylenol  1. Chronic right-sided low back pain without sciatica    2. Longstanding persistent atrial fibrillation (HCC)    3. Constipation, unspecified constipation type    4. Advance directive discussed with patient        I spent a total of 17 minutes with record review, exam, communication with the patient, communication with other providers, and documentation of this encounter.      Return in about 6 months (around 2/11/2022) for AWV.    Please note that this dictation was created using voice recognition software. I have made every reasonable attempt to correct obvious errors, but I expect that there are errors of grammar and possibly content that I did not discover before finalizing the note.

## 2021-12-02 RX ORDER — ATENOLOL 25 MG/1
TABLET ORAL
Qty: 100 TABLET | Refills: 3 | Status: SHIPPED | OUTPATIENT
Start: 2021-12-02 | End: 2023-06-01 | Stop reason: SDUPTHER

## 2021-12-21 ENCOUNTER — TELEPHONE (OUTPATIENT)
Dept: HEALTH INFORMATION MANAGEMENT | Facility: OTHER | Age: 86
End: 2021-12-21

## 2021-12-29 ENCOUNTER — APPOINTMENT (OUTPATIENT)
Dept: MEDICAL GROUP | Facility: PHYSICIAN GROUP | Age: 86
End: 2021-12-29
Payer: MEDICARE

## 2022-02-10 ENCOUNTER — OFFICE VISIT (OUTPATIENT)
Dept: MEDICAL GROUP | Facility: PHYSICIAN GROUP | Age: 87
End: 2022-02-10
Payer: MEDICARE

## 2022-02-10 VITALS
DIASTOLIC BLOOD PRESSURE: 70 MMHG | WEIGHT: 152.4 LBS | SYSTOLIC BLOOD PRESSURE: 122 MMHG | HEIGHT: 71 IN | HEART RATE: 80 BPM | OXYGEN SATURATION: 96 % | TEMPERATURE: 96.3 F | BODY MASS INDEX: 21.34 KG/M2 | RESPIRATION RATE: 16 BRPM

## 2022-02-10 DIAGNOSIS — I48.11 LONGSTANDING PERSISTENT ATRIAL FIBRILLATION (HCC): ICD-10-CM

## 2022-02-10 DIAGNOSIS — Z71.89 ADVANCE DIRECTIVE DISCUSSED WITH PATIENT: ICD-10-CM

## 2022-02-10 DIAGNOSIS — Z00.00 MEDICARE ANNUAL WELLNESS VISIT, SUBSEQUENT: Primary | ICD-10-CM

## 2022-02-10 PROCEDURE — G0439 PPPS, SUBSEQ VISIT: HCPCS | Performed by: FAMILY MEDICINE

## 2022-02-10 ASSESSMENT — ACTIVITIES OF DAILY LIVING (ADL): BATHING_REQUIRES_ASSISTANCE: 0

## 2022-02-10 ASSESSMENT — FIBROSIS 4 INDEX: FIB4 SCORE: 2.6

## 2022-02-10 ASSESSMENT — PATIENT HEALTH QUESTIONNAIRE - PHQ9: CLINICAL INTERPRETATION OF PHQ2 SCORE: 0

## 2022-02-10 NOTE — PROGRESS NOTES
Annual Health Assessment Questions:    1.  Are you currently engaging in any exercise or physical activity? No    2.  How would you describe your mood or emotional well-being today? good    3.  Have you had any falls in the last year? No    4.  Have you noticed any problems with your balance or had difficulty walking? Yes    5.  In the last six months have you experienced any leakage of urine? No    6. DPA/Advanced Directive: Patient has Advanced Directive on file.      Chief Complaint   Patient presents with   • Annual Wellness Visit       HPI:  Daphne Moss is a 89 y.o. here for Medicare Annual Wellness Visit, here w/ her dtr.  Problem   Atrial Fibrillation (Hcc)    Has deferred any treatment of this and understands the risk of clots/stroke. I offered aspirin or xarelto/eliquis and she deferred. Has also deferred a referral to cardiology.   We re-visited the issue 08/11/21 and again I explained to her the risk of stroke by not taking a blood thinner and she expresses understanding but continues to defer a rx or eval. She states she doesn't like taking pills.   Stable, continue current plan of care with current medications.        Advance Directive Discussed With Patient    Patient here with daughter Melissa Dixon. Has another daughter who she believes is her POA Minal Beth in [place]. She has a POLST form from [place] signed 4/17/17 that she is DNR/DNI and Comfort Measures only. She wants to continue with this and the [place] POLST form was signed 2/21 with me. She would be willing to take medicines orally only if they make her more comfortable. She does not want life prolonging medications           Patient Active Problem List    Diagnosis Date Noted   • Constipation 04/30/2021   • Pain of right eye 04/30/2021   • Healthcare maintenance 02/24/2021   • Atrial fibrillation (HCC) 02/24/2021   • Right-sided low back pain without sciatica 02/24/2021   • Hard of hearing 02/24/2021   • Advance directive discussed with patient  02/24/2021   • History of skin cancer 02/24/2021   • Weight loss 02/24/2021   • Skew deviation of right eye 02/24/2021   • Right sided sciatica 11/03/2020   • Arthritis of carpometacarpal (CMC) joint of right thumb 06/28/2018   • Osteopenia 05/31/2018   • Renal insufficiency syndrome 05/31/2018   • BMI 25.0-25.9,adult 07/13/2017   • Former smoker 07/13/2017       Current Outpatient Medications   Medication Sig Dispense Refill   • Non Formulary Request José Manuel 2 for macular degenration     • atenolol (TENORMIN) 25 MG Tab Take 1 tablet by mouth once daily 100 Tablet 3   • Cholecalciferol 1000 UNIT Cap Take 1,000 Units by mouth every day.       No current facility-administered medications for this visit.          Current supplements as per medication list.     Allergies: Patient has no known allergies.    Current social contact/activities: spend time outdoors     She  reports that she quit smoking about 70 years ago. She has a 1.50 pack-year smoking history. She has never used smokeless tobacco. She reports that she does not drink alcohol and does not use drugs.  Counseling given: Not Answered      DPA/Advanced Directive:  Patient has Advanced Directive on file.     ROS:    Gait: Uses no assistive device  Ostomy: No  Other tubes: No  Amputations: No  Chronic oxygen use: No  Last eye exam: 1/2022  Wears hearing aids: Yes   : Denies any urinary leakage during the last 6 months    Screening:  Annual  Depression Screening    Little interest or pleasure in doing things?  0 - not at all  Feeling down, depressed , or hopeless? 0 - not at all  Patient Health Questionnaire Score: 0     If depressive symptoms identified deferred to follow up visit unless specifically addressed in assessment and plan.    Interpretation of PHQ-9 Total Score   Score Severity   1-4 No Depression   5-9 Mild Depression   10-14 Moderate Depression   15-19 Moderately Severe Depression   20-27 Severe Depression    Screening for Cognitive  Impairment    Three Minute Recall (captain, garden, picture) 1/3    Siva clock face with all 12 numbers and set the hands to show 5 past 8.  Yes   I repeated the memory testing w/ 3 new words, at 5min she was 3/3  Cognitive concerns identified deferred for follow up unless specifically addressed in assessment and plan.    Fall Risk Assessment    Has the patient had two or more falls in the last year or any fall with injury in the last year?  No    HAS TRIED PT AND DIDN'T FEEL IT WORKED WELL. HAS A WALKING CANE, IS WORKING UP TO IT. HAS A WALKER AT HER DAUGHTER'S HOME IN [place]. DOESN'T WANT ONE.   Safety Assessment    Throw rugs on floor.  Yes  Handrails on all stairs.  Yes  Good lighting in all hallways.  Yes  Difficulty hearing.  No  Patient counseled about all safety risks that were identified.    Functional Assessment ADLs    Are there any barriers preventing you from cooking for yourself or meeting nutritional needs?  No.    Are there any barriers preventing you from driving safely or obtaining transportation?  No.    Are there any barriers preventing you from using a telephone or calling for help?  No.    Are there any barriers preventing you from shopping?  Yes.    Are there any barriers preventing you from taking care of your own finances?  No.    Are there any barriers preventing you from managing your medications?  No.    Are there any barriers preventing you from showering, bathing or dressing yourself?  No.    Are you currently engaging in any exercise or physical activity?  No.     What is your perception of your health?   .      Health Maintenance Summary          Overdue - IMM ZOSTER VACCINES (1 of 2) Overdue - never done    No completion history exists for this topic.          Overdue - BONE DENSITY (Every 5 Years) Overdue - never done    No completion history exists for this topic.          Overdue - IMM INFLUENZA (1) Overdue - never done    No completion history exists for this topic.           Postponed - IMM DTaP/Tdap/Td Vaccine (1 - Tdap) Postponed until 2022    No completion history exists for this topic.          Postponed - IMM PNEUMOCOCCAL VACCINE: 65+ Years (1 of 1 - PPSV23) Postponed until 2022    No completion history exists for this topic.          COVID-19 Vaccine (Series Information) Completed    10/12/2021  Imm Admin: Pfizer SARS-CoV-2 Vaccine 12+    2021  Imm Admin: Pfizer SARS-CoV-2 Vaccine    2021  Imm Admin: Pfizer SARS-CoV-2 Vaccine          Annual Wellness Visit  Completed    02/10/2022  Visit Dx: Medicare annual wellness visit, subsequent    02/10/2022  Level of Service: ANNUAL WELLNESS VISIT-INCLUDES PPPS SUBSEQUE*          IMM HEP B VACCINE (Series Information) Aged Out    No completion history exists for this topic.          IMM MENINGOCOCCAL VACCINE (MCV4) (Series Information) Aged Out    No completion history exists for this topic.          Discontinued - MAMMOGRAM  Discontinued    No completion history exists for this topic.          Discontinued - PAP SMEAR  Discontinued    No completion history exists for this topic.          Discontinued - COLORECTAL CANCER SCREENING  Discontinued    No completion history exists for this topic.                Patient Care Team:  Minal Larry M.D. as PCP - General (Family Medicine)        Social History     Tobacco Use   • Smoking status: Former Smoker     Packs/day: 0.50     Years: 3.00     Pack years: 1.50     Quit date: 1952     Years since quittin.1   • Smokeless tobacco: Never Used   Vaping Use   • Vaping Use: Never used   Substance Use Topics   • Alcohol use: Never   • Drug use: Never     Family History   Problem Relation Age of Onset   • Heart Disease Father    • Hyperlipidemia Father    • Heart Attack Paternal Grandfather      She  has no past medical history on file.   History reviewed. No pertinent surgical history.    Exam:   /70 (BP Location: Right arm, Patient Position: Sitting, BP Cuff  "Size: Adult)   Pulse 80   Temp (!) 35.7 °C (96.3 °F) (Temporal)   Resp 16   Ht 1.803 m (5' 11\")   Wt 69.1 kg (152 lb 6.4 oz)   SpO2 96%  Body mass index is 21.26 kg/m².    Hearing GOOD W/ HER HEARING AIDES.    Dentition good  Alert, oriented in no acute distress.  Eye contact is good, speech goal directed, affect calm    Assessment and Plan. The following treatment and monitoring plan is recommended:    1. Medicare annual wellness visit, subsequent    2. Advance directive discussed with patient    3. Longstanding persistent atrial fibrillation (HCC)    Other orders  - Non Formulary Request; José Manuel 2 for macular degenration    DEFERS DEXA.  Defers vaccines other than covid  Defers lab tests, were wnl 4/21.     Services suggested: No services needed at this time  Health Care Screening: Age-appropriate preventive services recommended by USPTF and ACIP covered by Medicare were discussed today. Services ordered if indicated and agreed upon by the patient.  Referrals offered: Community-based lifestyle interventions to reduce health risks and promote self-management and wellness, fall prevention, nutrition, physical activity, tobacco-use cessation, weight loss, and mental health services as per orders if indicated.    Discussion today about general wellness and lifestyle habits:    · Prevent falls and reduce trip hazards; Cautioned about securing or removing rugs.  · Have a working fire alarm and carbon monoxide detector;   · Engage in regular physical activity and social activities     Follow-up: No follow-ups on file.    "

## 2022-03-28 ENCOUNTER — PATIENT MESSAGE (OUTPATIENT)
Dept: HEALTH INFORMATION MANAGEMENT | Facility: OTHER | Age: 87
End: 2022-03-28

## 2022-05-06 ENCOUNTER — TELEPHONE (OUTPATIENT)
Dept: HEALTH INFORMATION MANAGEMENT | Facility: OTHER | Age: 87
End: 2022-05-06

## 2022-05-06 NOTE — TELEPHONE ENCOUNTER
Called to schedule COMPREHENSIVE HEALTH ASSESSMENT.   Unable to LVM, Mailbox not set up on Voicemail.

## 2022-08-09 ENCOUNTER — OFFICE VISIT (OUTPATIENT)
Dept: MEDICAL GROUP | Facility: PHYSICIAN GROUP | Age: 87
End: 2022-08-09
Payer: MEDICARE

## 2022-08-09 VITALS
BODY MASS INDEX: 21.28 KG/M2 | OXYGEN SATURATION: 95 % | SYSTOLIC BLOOD PRESSURE: 118 MMHG | WEIGHT: 152 LBS | DIASTOLIC BLOOD PRESSURE: 70 MMHG | TEMPERATURE: 97.5 F | HEIGHT: 71 IN | RESPIRATION RATE: 14 BRPM | HEART RATE: 88 BPM

## 2022-08-09 DIAGNOSIS — H61.23 EXCESSIVE CERUMEN IN BOTH EAR CANALS: ICD-10-CM

## 2022-08-09 DIAGNOSIS — Z85.828 HISTORY OF SKIN CANCER: ICD-10-CM

## 2022-08-09 DIAGNOSIS — M54.50 CHRONIC MIDLINE LOW BACK PAIN WITHOUT SCIATICA: ICD-10-CM

## 2022-08-09 DIAGNOSIS — G89.29 CHRONIC MIDLINE LOW BACK PAIN WITHOUT SCIATICA: ICD-10-CM

## 2022-08-09 DIAGNOSIS — I48.11 LONGSTANDING PERSISTENT ATRIAL FIBRILLATION (HCC): ICD-10-CM

## 2022-08-09 PROCEDURE — 99214 OFFICE O/P EST MOD 30 MIN: CPT | Performed by: FAMILY MEDICINE

## 2022-08-09 ASSESSMENT — FIBROSIS 4 INDEX: FIB4 SCORE: 2.63

## 2022-08-09 NOTE — ASSESSMENT & PLAN NOTE
History of osteopenia in past. Currently taking vitamin D3 OTC, discussed trying to do weight bearing exercises more often. Declined DEXA at this time.

## 2022-08-09 NOTE — PROGRESS NOTES
Subjective:     CC:   Chief Complaint   Patient presents with   • Establish Care     New Pt    • Back Pain     On going x years,    • Shoulder Pain     X 1 month,R, px radiates to arm        HISTORY OF THE PRESENT ILLNESS: Patient is a 89 y.o. female. This pleasant patient is here today to establish care and discuss her current medical conditions. Her prior PCP was Dr. Larry  R sided shoulder pain x 1 month. States the pain came on 1 month ago and felt like a knot and she massaged it and did not help. Worked it way down to external radiating discomfort. States her shoulder has very little discomfort but has occ. Arm pain near her bicep area. Has been modifying her chair so her arm is positioned differently when in a computer, so this has improved it.   Low back pain for many years, done PT which is not helpful. Nothing seems to help her low back pain and being sedentary helps. Walking aggravates her pain.   Denies any recent falls.     Atrial fibrillation (HCC)  Has deferred any treatment of this and understands the risk of clots/stroke. I offered aspirin or xarelto/eliquis and she deferred. Has also deferred a referral to cardiology, states she did see one when she was in [place] a few years ago.   She is aware of the risk of stroke by not taking a blood thinner and she expresses understanding but continues to defer a rx or eval. She states she doesn't like taking pills. Denies chest pain, SOB, palpitations.  Stable, continue current plan of care with current medications.       History of skin cancer  Hx of BCC on face, arms etc. Has not been seen by Derm here, referral placed in clinic    Osteopenia  History of osteopenia in past. Currently taking vitamin D3 OTC, discussed trying to do weight bearing exercises more often. Declined DEXA at this time.     Excessive cerumen in both ear canals  Getting ear lavages every 6 months due to excessive cerumen in ear canals.   Procedure: Cerumen Removal  Risks and  "benefits of procedure discussed with patient.  Cerumen removed with  lavage   Patient tolerated the procedure well  Pt educated about proper care of ear canal. Q-tip cleaning discouraged, use of debrox and warm water lavage discussed.        Current Outpatient Medications Ordered in Epic   Medication Sig Dispense Refill   • Non Formulary Request AREDS 2 for macular degenration      • atenolol (TENORMIN) 25 MG Tab Take 1 tablet by mouth once daily 100 Tablet 3   • Cholecalciferol 1000 UNIT Cap Take 1,000 Units by mouth every day.       No current [facility]-ordered facility-administered medications on file.       Health Maintenance: Completed      Objective:       Exam: /70 (BP Location: Right arm, Patient Position: Sitting, BP Cuff Size: Adult)   Pulse 88   Temp 36.4 °C (97.5 °F) (Temporal)   Resp 14   Ht 1.803 m (5' 11\")   Wt 68.9 kg (152 lb)   SpO2 95%  Body mass index is 21.2 kg/m².    Physical Exam  Vitals reviewed.   Constitutional:       General: She is not in acute distress.     Appearance: Normal appearance.   HENT:      Right Ear: Tympanic membrane, ear canal and external ear normal.      Left Ear: Tympanic membrane, ear canal and external ear normal.      Ears:      Comments: Mild-mod cerumen bilaterally  Eyes:      Conjunctiva/sclera: Conjunctivae normal.      Pupils: Pupils are equal, round, and reactive to light.   Cardiovascular:      Rate and Rhythm: Rhythm irregular.      Pulses: Normal pulses.      Comments: Reg irregular heart sounds  Pulmonary:      Effort: Pulmonary effort is normal. No respiratory distress.      Breath sounds: Normal breath sounds. No stridor. No wheezing, rhonchi or rales.   Chest:      Chest wall: No tenderness.   Abdominal:      General: Abdomen is flat. Bowel sounds are normal.   Musculoskeletal:      Right lower leg: No edema.      Left lower leg: No edema.   Neurological:      Mental Status: She is alert and oriented to person, place, and time.   Psychiatric:         " Mood and Affect: Mood normal.         Behavior: Behavior normal.          A chaperone was offered to the patient during today's exam. Chaperone name: daughter was present.        Assessment & Plan:   89 y.o. female with the following -    1. Longstanding persistent atrial fibrillation (HCC)  Chronic, stable on current regimen. Defers Cardiology or blood thinners  2. History of skin cancer  - Referral to Dermatology    3. Excessive cerumen in both ear canals  Chronic, ears lavaged in clinic. Tolerated well  4. Chronic midline low back pain without sciatica   Chronic, defers imaging or PT at this time. Discussed walking throughout the day and avoid being sedentary. If worsens will let me know and we can obtain imaging    I spent a total of 34minutes with record review, exam, communication with the patient, communication with other providers, and documentation of this encounter.    Return in about 6 months (around 2/9/2023).    Please note that this dictation was created using voice recognition software. I have made every reasonable attempt to correct obvious errors, but I expect that there are errors of grammar and possibly content that I did not discover before finalizing the note.

## 2022-08-09 NOTE — ASSESSMENT & PLAN NOTE
Getting ear lavages every 6 months due to excessive cerumen in ear canals.   Procedure: Cerumen Removal  Risks and benefits of procedure discussed with patient.  Cerumen removed with  lavage   Patient tolerated the procedure well  Pt educated about proper care of ear canal. Q-tip cleaning discouraged, use of debrox and warm water lavage discussed.

## 2022-08-09 NOTE — ASSESSMENT & PLAN NOTE
Has deferred any treatment of this and understands the risk of clots/stroke. I offered aspirin or xarelto/eliquis and she deferred. Has also deferred a referral to cardiology, states she did see one when she was in [place] a few years ago.   She is aware of the risk of stroke by not taking a blood thinner and she expresses understanding but continues to defer a rx or eval. She states she doesn't like taking pills. Denies chest pain, SOB, palpitations.  Stable, continue current plan of care with current medications.

## 2022-09-15 ENCOUNTER — OFFICE VISIT (OUTPATIENT)
Dept: DERMATOLOGY | Facility: IMAGING CENTER | Age: 87
End: 2022-09-15
Payer: MEDICARE

## 2022-09-15 DIAGNOSIS — D22.9 NEVUS: ICD-10-CM

## 2022-09-15 DIAGNOSIS — Z85.828 HISTORY OF BASAL CELL CARCINOMA: ICD-10-CM

## 2022-09-15 DIAGNOSIS — L81.4 LENTIGO: ICD-10-CM

## 2022-09-15 DIAGNOSIS — L82.1 SEBORRHEIC KERATOSIS: ICD-10-CM

## 2022-09-15 DIAGNOSIS — L85.3 XEROSIS OF SKIN: ICD-10-CM

## 2022-09-15 PROCEDURE — 99203 OFFICE O/P NEW LOW 30 MIN: CPT | Performed by: DERMATOLOGY

## 2022-09-15 NOTE — PROGRESS NOTES
CC:  Upper body Skin Exam Only    Subjective: New pt here today to establish care and have an upper body skin exam.      History of skin cancer: Yes, BCC Face 2002. On back / pt states there's multiple BCC, but not specific.  History of precancers/actinic keratoses: No  History of biopsies:Yes, Details: See Above  History of blistering/severe sunburns:No  Family history of skin cancer:No  Family history of atypical moles:No    ROS: no fevers/chills.  No cough  Relevant PMH: NC  Social: former smoker    PE: Gen:WDWN female in NAD. Skin: scalp/face/neck/chest/back/arms/hands examined with findings as noted:  -many scattered waxy papules/plaques on body  -many tan macules on torso and extremities, appearing benign    A/P: Nevi: benign appearing:  -Reviewed skin cancer detection/prevention  -RTC PRN growth/changes/concerning features    Lentigos/SKs: benign  -reassurance  -reviewed skin cancer detection/prevention    Hx of skin cancer: BCC  -cont'd sunprotection/avoidance and skin cancer surveillance  -Q 6mo-annual exam recommended; f/u suspicious lesions PRN  -prefers once annual exam by report    Reviewed management of dry skin with moisturizers and handout supplied    I have reviewed medications relevant to my specialty.

## 2022-11-02 ENCOUNTER — DOCUMENTATION (OUTPATIENT)
Dept: HEALTH INFORMATION MANAGEMENT | Facility: OTHER | Age: 87
End: 2022-11-02
Payer: MEDICARE

## 2023-01-20 ENCOUNTER — TELEPHONE (OUTPATIENT)
Dept: SCHEDULING | Facility: IMAGING CENTER | Age: 88
End: 2023-01-20

## 2023-01-23 ENCOUNTER — APPOINTMENT (OUTPATIENT)
Dept: MEDICAL GROUP | Facility: PHYSICIAN GROUP | Age: 88
End: 2023-01-23
Payer: MEDICARE

## 2023-01-24 ENCOUNTER — OFFICE VISIT (OUTPATIENT)
Dept: MEDICAL GROUP | Facility: MEDICAL CENTER | Age: 88
End: 2023-01-24
Payer: MEDICARE

## 2023-01-24 VITALS
DIASTOLIC BLOOD PRESSURE: 70 MMHG | BODY MASS INDEX: 22.19 KG/M2 | OXYGEN SATURATION: 96 % | WEIGHT: 158.51 LBS | SYSTOLIC BLOOD PRESSURE: 130 MMHG | TEMPERATURE: 97.2 F | HEIGHT: 71 IN | RESPIRATION RATE: 18 BRPM | HEART RATE: 80 BPM

## 2023-01-24 DIAGNOSIS — N18.31 STAGE 3A CHRONIC KIDNEY DISEASE: ICD-10-CM

## 2023-01-24 DIAGNOSIS — G89.29 CHRONIC RIGHT-SIDED LOW BACK PAIN WITHOUT SCIATICA: ICD-10-CM

## 2023-01-24 DIAGNOSIS — M54.50 CHRONIC RIGHT-SIDED LOW BACK PAIN WITHOUT SCIATICA: ICD-10-CM

## 2023-01-24 DIAGNOSIS — Z23 IMMUNIZATION DUE: ICD-10-CM

## 2023-01-24 DIAGNOSIS — I10 PRIMARY HYPERTENSION: ICD-10-CM

## 2023-01-24 DIAGNOSIS — H61.23 EXCESSIVE CERUMEN IN BOTH EAR CANALS: ICD-10-CM

## 2023-01-24 DIAGNOSIS — I48.11 LONGSTANDING PERSISTENT ATRIAL FIBRILLATION (HCC): ICD-10-CM

## 2023-01-24 DIAGNOSIS — M85.80 OSTEOPENIA, UNSPECIFIED LOCATION: ICD-10-CM

## 2023-01-24 DIAGNOSIS — Z00.00 ENCOUNTER FOR MEDICAL EXAMINATION TO ESTABLISH CARE: ICD-10-CM

## 2023-01-24 PROBLEM — R63.4 WEIGHT LOSS: Status: RESOLVED | Noted: 2021-02-24 | Resolved: 2023-01-24

## 2023-01-24 PROBLEM — N28.9 RENAL INSUFFICIENCY SYNDROME: Status: RESOLVED | Noted: 2018-05-31 | Resolved: 2023-01-24

## 2023-01-24 PROCEDURE — 69209 REMOVE IMPACTED EAR WAX UNI: CPT | Performed by: STUDENT IN AN ORGANIZED HEALTH CARE EDUCATION/TRAINING PROGRAM

## 2023-01-24 PROCEDURE — 99214 OFFICE O/P EST MOD 30 MIN: CPT | Mod: 25 | Performed by: STUDENT IN AN ORGANIZED HEALTH CARE EDUCATION/TRAINING PROGRAM

## 2023-01-24 RX ORDER — LIDOCAINE PAIN RELIEF 40 MG/1000MG
PATCH TOPICAL
Qty: 30 PATCH | Refills: 0 | Status: SHIPPED | OUTPATIENT
Start: 2023-01-24 | End: 2023-07-23

## 2023-01-24 ASSESSMENT — FIBROSIS 4 INDEX: FIB4 SCORE: 2.63

## 2023-01-24 ASSESSMENT — PATIENT HEALTH QUESTIONNAIRE - PHQ9: CLINICAL INTERPRETATION OF PHQ2 SCORE: 0

## 2023-01-24 NOTE — PROCEDURES
Ear Wax Removal    Date/Time: 1/24/2023 9:35 AM  Performed by: Estefania Jiménez M.D.  Authorized by: Estefania Jiménez M.D.     Anesthesia:  Local Anesthetic: none  Location details: right ear and left ear  Patient tolerance: patient tolerated the procedure well with no immediate complications  Procedure type: irrigation (Followed by curette)    Sedation:  Patient sedated: no

## 2023-01-24 NOTE — PROGRESS NOTES
Subjective:     CC:  Diagnoses of Excessive cerumen in both ear canals, Longstanding persistent atrial fibrillation (HCC), Stage 3a chronic kidney disease (HCC), Chronic right-sided low back pain without sciatica, Primary hypertension, Osteopenia, unspecified location, and Encounter for medical examination to establish care were pertinent to this visit.    HISTORY OF THE PRESENT ILLNESS: Patient is a 89 y.o. female. This pleasant patient is here today to establish care and discuss the following.    Problem   Primary Hypertension    Chronic, stable, currently on atenolol 25 mg daily.     Stage 3a Chronic Kidney Disease (Hcc)    Chronic, last checked over a year ago and GFR was between 45 and 60.  There are no previous records to compare this to.     Excessive Cerumen in Both Ear Canals    Chronic, stable, gets ear flushes every 6 months         Atrial Fibrillation (Hcc)    This is a chronic, well controlled condition.  She is on atenolol 25 mg daily for rate control.  She is not on any anticoagulation understands the risks of this.  She once again declines referral to cardiology or anticoagulation.     Chronic Right-Sided Low Back Pain Without Sciatica    This is a chronic, uncontrolled condition.  She does not like taking pain medication so is not taking anything for this at this time.  The pain is mostly on the right side of her lower back.  She denies any sciatica.  She has done physical therapy several years ago and did not feel like it was helpful.  She is interested in not on oral pain relief     Osteopenia    History of osteopenia in past.  Currently taking vitamin D supplementation.     Weight Loss (Resolved)   Renal Insufficiency Syndrome (Resolved)    Last Assessment & Plan:   Formatting of this note might be different from the original.  Mild. Stable over the last 2-3 years. Patient declines need for referral to nephrology. Continue to avoid nephrotoxins and monitor.     Bmi 25.0-25.9,Adult (Resolved)  "      ROS:   ROS      Objective:       Exam: /70 (BP Location: Left arm, Patient Position: Sitting, BP Cuff Size: Adult)   Pulse 80   Temp 36.2 °C (97.2 °F) (Temporal)   Resp 18   Ht 1.803 m (5' 11\")   Wt 71.9 kg (158 lb 8.2 oz)   SpO2 96%  Body mass index is 22.11 kg/m².    Physical Exam  Vitals reviewed.   Constitutional:       General: She is not in acute distress.     Appearance: She is not toxic-appearing.   HENT:      Head: Normocephalic and atraumatic.      Right Ear: Tympanic membrane and external ear normal. There is impacted cerumen.      Left Ear: Tympanic membrane and external ear normal. There is impacted cerumen.   Eyes:      General:         Right eye: No discharge.         Left eye: No discharge.      Extraocular Movements: Extraocular movements intact.      Conjunctiva/sclera: Conjunctivae normal.   Pulmonary:      Effort: Pulmonary effort is normal. No respiratory distress.   Skin:     General: Skin is warm and dry.   Neurological:      Mental Status: She is alert.   Psychiatric:         Mood and Affect: Mood normal.         Behavior: Behavior normal.         Thought Content: Thought content normal.         Judgment: Judgment normal.         Assessment & Plan:   89 y.o. female with the following -    1. Encounter for medical examination to establish care  History, problem list, medications and allergies reviewed.  Records requested from previous provider if applicable.    2. Excessive cerumen in both ear canals  Discussed using Debrox drops to help with impacted cerumen between cleanings.  Remove earwax today.  - Ear Wax Removal    3. Longstanding persistent atrial fibrillation (HCC)  Rate controlled today, no adjustments to medication needed, once again discussed anticoagulation or referral to cardiology.  She declines both    4. Stage 3a chronic kidney disease (HCC)  Unclear if this is new or old, repeat CMP to assess kidney function  - Comp Metabolic Panel; Future    5. Chronic " right-sided low back pain without sciatica  Discussed referral to physical therapy which patient declines.  We also discussed using Tylenol as needed and avoiding ibuprofen or other NSAIDs due to her kidney function and concern for stomach complications.  She does not like taking pills.  We discussed using lidocaine patches which she would like to try.  - Lidocaine (LIDOCAINE PAIN RELIEF) 4 % Patch; Use one patch daily as needed for pain  Dispense: 30 Patch; Refill: 0    6. Primary hypertension  Well-controlled today, continue atenolol 25 mg daily    7. Osteopenia, unspecified location  Continue vitamin D supplementation    8. Immunization due  Patient declines flu vaccine, pneumonia vaccine, COVID booster, she states that she had a tetanus shot about 8 years ago so does not think she is due for this yet.      HCC Gap Form    Diagnosis to address: I48.11 - Longstanding persistent atrial fibrillation (HCC)  Assessment and plan: Chronic, stable. Continue with current defined treatment plan: rate control only. Follow-up at least annually.  Diagnosis: I70.0 - Atherosclerosis of aorta (HCC)  Assessment and plan: Chronic, stable. Continue with current defined treatment plan: not an any medication, monitor. Follow-up at least annually.  Last edited 01/24/23 09:35 PST by Estefania Jiménez M.D.       Return in about 6 months (around 7/24/2023) for Annual.    Please note that this dictation was created using voice recognition software. I have made every reasonable attempt to correct obvious errors, but I expect that there are errors of grammar and possibly content that I did not discover before finalizing the note.

## 2023-02-22 ENCOUNTER — HOSPITAL ENCOUNTER (OUTPATIENT)
Dept: LAB | Facility: MEDICAL CENTER | Age: 88
End: 2023-02-22
Attending: STUDENT IN AN ORGANIZED HEALTH CARE EDUCATION/TRAINING PROGRAM
Payer: MEDICARE

## 2023-02-22 DIAGNOSIS — N18.31 STAGE 3A CHRONIC KIDNEY DISEASE: ICD-10-CM

## 2023-02-22 LAB
ALBUMIN SERPL BCP-MCNC: 4.1 G/DL (ref 3.2–4.9)
ALBUMIN/GLOB SERPL: 1.5 G/DL
ALP SERPL-CCNC: 68 U/L (ref 30–99)
ALT SERPL-CCNC: 14 U/L (ref 2–50)
ANION GAP SERPL CALC-SCNC: 12 MMOL/L (ref 7–16)
AST SERPL-CCNC: 24 U/L (ref 12–45)
BILIRUB SERPL-MCNC: 0.7 MG/DL (ref 0.1–1.5)
BUN SERPL-MCNC: 33 MG/DL (ref 8–22)
CALCIUM ALBUM COR SERPL-MCNC: 9.2 MG/DL (ref 8.5–10.5)
CALCIUM SERPL-MCNC: 9.3 MG/DL (ref 8.4–10.2)
CHLORIDE SERPL-SCNC: 105 MMOL/L (ref 96–112)
CO2 SERPL-SCNC: 24 MMOL/L (ref 20–33)
CREAT SERPL-MCNC: 1.18 MG/DL (ref 0.5–1.4)
FASTING STATUS PATIENT QL REPORTED: NORMAL
GFR SERPLBLD CREATININE-BSD FMLA CKD-EPI: 43 ML/MIN/1.73 M 2
GLOBULIN SER CALC-MCNC: 2.8 G/DL (ref 1.9–3.5)
GLUCOSE SERPL-MCNC: 106 MG/DL (ref 65–99)
POTASSIUM SERPL-SCNC: 4.4 MMOL/L (ref 3.6–5.5)
PROT SERPL-MCNC: 6.9 G/DL (ref 6–8.2)
SODIUM SERPL-SCNC: 141 MMOL/L (ref 135–145)

## 2023-02-22 PROCEDURE — 80053 COMPREHEN METABOLIC PANEL: CPT

## 2023-02-22 PROCEDURE — 36415 COLL VENOUS BLD VENIPUNCTURE: CPT

## 2023-06-01 ENCOUNTER — PATIENT MESSAGE (OUTPATIENT)
Dept: MEDICAL GROUP | Facility: MEDICAL CENTER | Age: 88
End: 2023-06-01
Payer: MEDICARE

## 2023-06-01 RX ORDER — ATENOLOL 25 MG/1
25 TABLET ORAL DAILY
Qty: 100 TABLET | Refills: 3 | Status: SHIPPED | OUTPATIENT
Start: 2023-06-01

## 2023-06-01 NOTE — PATIENT COMMUNICATION
Received request via: Patient    Was the patient seen in the last year in this department? Yes    Does the patient have an active prescription (recently filled or refills available) for medication(s) requested? No    Does the patient have [facility] and need 100 day supply (blood pressure, diabetes and cholesterol meds only)? Yes, quantity updated to 100 days

## 2023-06-22 ENCOUNTER — OFFICE VISIT (OUTPATIENT)
Dept: DERMATOLOGY | Facility: IMAGING CENTER | Age: 88
End: 2023-06-22
Payer: MEDICARE

## 2023-06-22 DIAGNOSIS — D49.2 NEOPLASM OF SKIN: ICD-10-CM

## 2023-06-22 PROCEDURE — 11311 SHAVE SKIN LESION 0.6-1.0 CM: CPT | Performed by: DERMATOLOGY

## 2023-06-22 NOTE — PROGRESS NOTES
CC: skin lesion     Subjective: Previously seen patient here for new spot on face.    HPI:  skin lesion   Location:  left side of jaw   Time present: 6 weeks   Painful lesion: No  Itching lesion: No  Enlarging lesion: No  Anything make it better or worse?no        History of skin cancer: Yes, BCC Face 2002. On back / pt states there's multiple BCC, but not specific.  History of precancers/actinic keratoses: No  History of biopsies:Yes, Details: See Above  History of blistering/severe sunburns:No  Family history of skin cancer:No  Family history of atypical moles:No      ROS: no fevers/chills. No itch.  No cough  Relevant PMH:mult med comorbidities  Social:FS; with family    PE: Gen:WDWN female in NAD. Skin: focal exam: verrucous papule on left jaw, approx 0.6cm. declined additional exam of skin today    A/P: Neoplasm NOS: jaw/face  -consent for bx, including R/B/A. Cleaned with EtOH, anesthesia with lidocaine 1% + epinephrine, shave bx, AlCl3 for hemostasis  -vaseline/bandage and wound care reviewed    Has NADYA in Sept per family      I have reviewed medications relevant to my specialty.

## 2023-06-30 ENCOUNTER — TELEPHONE (OUTPATIENT)
Dept: HEALTH INFORMATION MANAGEMENT | Facility: OTHER | Age: 88
End: 2023-06-30
Payer: MEDICARE

## 2023-07-07 ENCOUNTER — TELEPHONE (OUTPATIENT)
Dept: DERMATOLOGY | Facility: IMAGING CENTER | Age: 88
End: 2023-07-07
Payer: MEDICARE

## 2023-07-23 ENCOUNTER — HOSPITAL ENCOUNTER (EMERGENCY)
Facility: MEDICAL CENTER | Age: 88
End: 2023-07-23
Attending: EMERGENCY MEDICINE
Payer: MEDICARE

## 2023-07-23 VITALS
WEIGHT: 152 LBS | DIASTOLIC BLOOD PRESSURE: 81 MMHG | RESPIRATION RATE: 16 BRPM | SYSTOLIC BLOOD PRESSURE: 168 MMHG | HEIGHT: 71 IN | HEART RATE: 60 BPM | OXYGEN SATURATION: 94 % | TEMPERATURE: 97.4 F | BODY MASS INDEX: 21.28 KG/M2

## 2023-07-23 DIAGNOSIS — M54.17 LUMBOSACRAL RADICULOPATHY: ICD-10-CM

## 2023-07-23 PROCEDURE — 700102 HCHG RX REV CODE 250 W/ 637 OVERRIDE(OP): Performed by: EMERGENCY MEDICINE

## 2023-07-23 PROCEDURE — 99284 EMERGENCY DEPT VISIT MOD MDM: CPT

## 2023-07-23 PROCEDURE — A9270 NON-COVERED ITEM OR SERVICE: HCPCS | Performed by: EMERGENCY MEDICINE

## 2023-07-23 PROCEDURE — 700111 HCHG RX REV CODE 636 W/ 250 OVERRIDE (IP): Performed by: EMERGENCY MEDICINE

## 2023-07-23 RX ORDER — ACETAMINOPHEN 325 MG/1
650 TABLET ORAL ONCE
Status: COMPLETED | OUTPATIENT
Start: 2023-07-23 | End: 2023-07-23

## 2023-07-23 RX ORDER — PREDNISONE 20 MG/1
40 TABLET ORAL DAILY
Qty: 8 TABLET | Refills: 0 | Status: SHIPPED | OUTPATIENT
Start: 2023-07-23 | End: 2023-07-27

## 2023-07-23 RX ADMIN — PREDNISONE 60 MG: 50 TABLET ORAL at 11:20

## 2023-07-23 RX ADMIN — ACETAMINOPHEN 650 MG: 325 TABLET ORAL at 11:20

## 2023-07-23 NOTE — ED PROVIDER NOTES
"ED Provider Note    CHIEF COMPLAINT  Chief Complaint   Patient presents with    Hip Pain    Leg Pain         HPI/ROS  Daphne Moss is a 89 y.o. female who presents with right hip and leg pain.  The patient states the pain goes from the buttocks region and right hip down into her thigh.  She states the pain is sharp and worse with certain movements.  She does not have any back discomfort.  She is unaware of any trauma.  She states has been increasing over the last couple of days.  She has not been taking any medication.  She does not have any bowel or bladder dysfunction.  She does not have any paresthesias nor functional loss of her lower extremities.    PAST MEDICAL HISTORY   has a past medical history of Atrial fibrillation (HCC).    SURGICAL HISTORY  patient denies any surgical history    FAMILY HISTORY  Family History   Problem Relation Age of Onset    Heart Disease Father     Hyperlipidemia Father     Heart Disease Brother     Heart Attack Paternal Grandfather        SOCIAL HISTORY  Social History     Tobacco Use    Smoking status: Former     Packs/day: 0.50     Years: 3.00     Pack years: 1.50     Types: Cigarettes     Quit date: 1952     Years since quittin.6    Smokeless tobacco: Never   Vaping Use    Vaping Use: Never used   Substance and Sexual Activity    Alcohol use: Never    Drug use: Never    Sexual activity: Not Currently       CURRENT MEDICATIONS  Home Medications       Reviewed by Denise Young R.N. (Registered Nurse) on 23 at 1048  Med List Status: Not Addressed     Medication Last Dose Status   atenolol (TENORMIN) 25 MG Tab  Active   Cholecalciferol 1000 UNIT Cap  Active   Lidocaine (LIDOCAINE PAIN RELIEF) 4 % Patch  Active   Non Formulary Request  Active                    ALLERGIES  No Known Allergies    PHYSICAL EXAM  VITAL SIGNS: /79   Pulse 67   Temp 36.3 °C (97.4 °F) (Temporal)   Resp 16   Ht 1.803 m (5' 11\")   Wt 68.9 kg (152 lb)   SpO2 94%   BMI 21.20 " kg/m²    In general the patient appears uncomfortable    Cervical, thoracic, lumbar spine does not have any midline tenderness nor step-offs    Extremities the patient does not have any pain with internal/external rotation at the right hip.  She has a normal right knee and right ankle exam.  She has no obvious skeletal deformities and no evidence of trauma to the right lower extremity    Skin no erythema nor rashes present to the low back and right lower extremity    Neurovascular examination is grossly intact to the lower extremities with 5 out of 5 motor strength and sensation preserved throughout      COURSE & MEDICAL DECISION MAKING    This a 89-year-old female who presents the emerged part with pain that extends from the buttocks and the right hip down into her leg.  I suspect this is from a lumbar sacral radiculopathy.  She is neurologically intact.  She has not had any acute trauma that would warrant plain film imaging.  We opted to treat the patient with prednisone and Tylenol in the emergency department to see if she would respond and she did have significant improvement after approximate hour of observation.  She continues to be neurologically intact.  The patient will be discharged home on 4 days of prednisone for suspected radiculopathy and she will continue Tylenol as needed.  I like the patient to recheck with her primary care provider in 3 to 5 days.  She will return to the emergency room for increased pain, weakness to her lower extremities, or difficulty initiating her stream of urine.    FINAL DIAGNOSIS  1.  Right hip and thigh pain  2.  Suspect lumbar sacral radiculopathy    Disposition  Patient will be discharged in stable condition       Electronically signed by: Blaine Santana M.D., 7/23/2023 11:12 AM

## 2023-07-23 NOTE — ED NOTES
Pt stated that her Pn level right now is 3/10, Pt and family member both denied having any needs at this time, no distress noted;

## 2023-07-23 NOTE — ED NOTES
Patient is stable for discharge at this time, anticipatory guidance provided, close follow-up is encouraged, and ED return instructions have been detailed. Patient and family are both agreeable to the disposition and plan and discharged home via WC and in good condition.     Rx education provided, Pt verbalized understanding;

## 2023-07-23 NOTE — ED TRIAGE NOTES
Pt presents with daughter from home for right leg pain and right hip that radiates to her back. Last night pain significantly worsened. Reports chronic problems with left hip but not right side. Normally ambulatory without assistance. Currently using a wheelchair. Pt AO4.

## 2023-07-27 ENCOUNTER — OFFICE VISIT (OUTPATIENT)
Dept: MEDICAL GROUP | Facility: MEDICAL CENTER | Age: 88
End: 2023-07-27
Payer: MEDICARE

## 2023-07-27 VITALS
TEMPERATURE: 97.4 F | OXYGEN SATURATION: 97 % | RESPIRATION RATE: 18 BRPM | BODY MASS INDEX: 21.91 KG/M2 | WEIGHT: 156.53 LBS | HEIGHT: 71 IN | DIASTOLIC BLOOD PRESSURE: 82 MMHG | HEART RATE: 63 BPM | SYSTOLIC BLOOD PRESSURE: 154 MMHG

## 2023-07-27 DIAGNOSIS — G89.29 CHRONIC RIGHT-SIDED LOW BACK PAIN WITH RIGHT-SIDED SCIATICA: ICD-10-CM

## 2023-07-27 DIAGNOSIS — N18.31 STAGE 3A CHRONIC KIDNEY DISEASE: ICD-10-CM

## 2023-07-27 DIAGNOSIS — Z23 IMMUNIZATION DUE: ICD-10-CM

## 2023-07-27 DIAGNOSIS — I10 PRIMARY HYPERTENSION: ICD-10-CM

## 2023-07-27 DIAGNOSIS — M25.551 RIGHT HIP PAIN: ICD-10-CM

## 2023-07-27 DIAGNOSIS — M54.41 CHRONIC RIGHT-SIDED LOW BACK PAIN WITH RIGHT-SIDED SCIATICA: ICD-10-CM

## 2023-07-27 PROBLEM — M54.40 CHRONIC RIGHT-SIDED LOW BACK PAIN WITH SCIATICA: Status: ACTIVE | Noted: 2021-02-24

## 2023-07-27 PROCEDURE — 3077F SYST BP >= 140 MM HG: CPT | Performed by: STUDENT IN AN ORGANIZED HEALTH CARE EDUCATION/TRAINING PROGRAM

## 2023-07-27 PROCEDURE — 90471 IMMUNIZATION ADMIN: CPT | Performed by: STUDENT IN AN ORGANIZED HEALTH CARE EDUCATION/TRAINING PROGRAM

## 2023-07-27 PROCEDURE — 3079F DIAST BP 80-89 MM HG: CPT | Performed by: STUDENT IN AN ORGANIZED HEALTH CARE EDUCATION/TRAINING PROGRAM

## 2023-07-27 PROCEDURE — 90715 TDAP VACCINE 7 YRS/> IM: CPT | Performed by: STUDENT IN AN ORGANIZED HEALTH CARE EDUCATION/TRAINING PROGRAM

## 2023-07-27 PROCEDURE — 99214 OFFICE O/P EST MOD 30 MIN: CPT | Mod: 25 | Performed by: STUDENT IN AN ORGANIZED HEALTH CARE EDUCATION/TRAINING PROGRAM

## 2023-07-27 NOTE — PROGRESS NOTES
"Subjective:     CC: Back pain, Hip pain    HPI:   Daphne presents today with    Problem   Right Hip Pain    This is a new problem that started several days ago.  She states she did not have any trauma to the area.  She has pain in the right hip and going down the right leg.  She did take some steroids and time which she felt relieved some of the pain.     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  She does not like taking pain medication so generally tries to avoid it.  She was seen in the emergency department was given a course of steroids and has been taking Tylenol with some relief.  The pain is mostly on the right side of her lower back and goes down the right side.  Right hip pain is present as well.  She has tried physical therapy before and did not feel like it helped.       ROS:  ROS    Objective:     Exam:  BP (!) 154/82   Pulse 63   Temp 36.3 °C (97.4 °F) (Temporal)   Resp 18   Ht 1.803 m (5' 11\")   Wt 71 kg (156 lb 8.4 oz)   SpO2 97%   BMI 21.83 kg/m²  Body mass index is 21.83 kg/m².    Physical Exam  Vitals reviewed.   Constitutional:       General: She is not in acute distress.     Appearance: She is not toxic-appearing.   HENT:      Head: Normocephalic and atraumatic.      Right Ear: External ear normal.      Left Ear: External ear normal.   Eyes:      General:         Right eye: No discharge.         Left eye: No discharge.      Extraocular Movements: Extraocular movements intact.      Conjunctiva/sclera: Conjunctivae normal.   Cardiovascular:      Rate and Rhythm: Normal rate. Rhythm irregular.      Heart sounds: Normal heart sounds. No murmur heard.     No gallop.   Pulmonary:      Effort: Pulmonary effort is normal. No respiratory distress.      Breath sounds: Normal breath sounds. No wheezing or rales.   Skin:     General: Skin is warm and dry.   Neurological:      Mental Status: " She is alert.   Psychiatric:         Mood and Affect: Mood normal.         Behavior: Behavior normal.         Thought Content: Thought content normal.         Judgment: Judgment normal.           Assessment & Plan:     89 y.o. female with the following -     1. Chronic right-sided low back pain with right-sided sciatica  She has tried conservative therapy with anti-inflammatories and physical therapy.  Pain is worsening especially the last several days, x-ray ordered, discussed referral to physiatry but she would like to wait until x-ray results are back  - DX-LUMBAR SPINE-2 OR 3 VIEWS; Future    2. Right hip pain  This is a new condition, no trauma, x-ray ordered  - DX-HIP-COMPLETE - UNILATERAL 2+ RIGHT; Future    3. Primary hypertension  Elevated today, likely secondary to pain, previously WNL    4. Immunization due  - Tdap =>6yo IM    5. Stage 3a chronic kidney disease (HCC)  We discussed that she can continue using Tylenol but because of her kidney function I would not recommend using NSAIDs    HCC Gap Form    Last edited 07/27/23 10:35 PDT by Estefania Jiménez M.D.         No follow-ups on file.    Please note that this dictation was created using voice recognition software. I have made every reasonable attempt to correct obvious errors, but I expect that there are errors of grammar and possibly content that I did not discover before finalizing the note.

## 2023-07-28 ENCOUNTER — APPOINTMENT (OUTPATIENT)
Dept: RADIOLOGY | Facility: MEDICAL CENTER | Age: 88
End: 2023-07-28
Attending: STUDENT IN AN ORGANIZED HEALTH CARE EDUCATION/TRAINING PROGRAM
Payer: MEDICARE

## 2023-07-28 DIAGNOSIS — G89.29 CHRONIC RIGHT-SIDED LOW BACK PAIN WITH RIGHT-SIDED SCIATICA: ICD-10-CM

## 2023-07-28 DIAGNOSIS — M54.41 CHRONIC RIGHT-SIDED LOW BACK PAIN WITH RIGHT-SIDED SCIATICA: ICD-10-CM

## 2023-07-28 DIAGNOSIS — M25.551 RIGHT HIP PAIN: ICD-10-CM

## 2023-07-28 PROCEDURE — 72100 X-RAY EXAM L-S SPINE 2/3 VWS: CPT

## 2023-07-28 PROCEDURE — 73502 X-RAY EXAM HIP UNI 2-3 VIEWS: CPT | Mod: RT

## 2023-07-31 NOTE — RESULT ENCOUNTER NOTE
Please call patient and let her know that her x-ray showed a compression fracture at L1 and L4 and is unable to determine the age of these fractures.  I would suggest seeing the physiatrist as these can be extremely painful.  If this is something she is interested and I will put in that referral.    Thank You,  Dr. Jiménez

## 2023-08-01 ENCOUNTER — OFFICE VISIT (OUTPATIENT)
Dept: MEDICAL GROUP | Facility: MEDICAL CENTER | Age: 88
End: 2023-08-01
Payer: MEDICARE

## 2023-08-01 ENCOUNTER — HOSPITAL ENCOUNTER (OUTPATIENT)
Facility: MEDICAL CENTER | Age: 88
End: 2023-08-01
Attending: STUDENT IN AN ORGANIZED HEALTH CARE EDUCATION/TRAINING PROGRAM
Payer: MEDICARE

## 2023-08-01 VITALS
OXYGEN SATURATION: 97 % | BODY MASS INDEX: 20.99 KG/M2 | DIASTOLIC BLOOD PRESSURE: 66 MMHG | WEIGHT: 149.91 LBS | SYSTOLIC BLOOD PRESSURE: 144 MMHG | RESPIRATION RATE: 16 BRPM | HEIGHT: 71 IN | HEART RATE: 77 BPM | TEMPERATURE: 97.5 F

## 2023-08-01 DIAGNOSIS — M48.56XA COMPRESSION FRACTURE OF LUMBAR SPINE, NON-TRAUMATIC, INITIAL ENCOUNTER (HCC): ICD-10-CM

## 2023-08-01 DIAGNOSIS — Z51.81 MEDICATION MONITORING ENCOUNTER: ICD-10-CM

## 2023-08-01 PROCEDURE — 3077F SYST BP >= 140 MM HG: CPT | Performed by: STUDENT IN AN ORGANIZED HEALTH CARE EDUCATION/TRAINING PROGRAM

## 2023-08-01 PROCEDURE — 80307 DRUG TEST PRSMV CHEM ANLYZR: CPT

## 2023-08-01 PROCEDURE — 3078F DIAST BP <80 MM HG: CPT | Performed by: STUDENT IN AN ORGANIZED HEALTH CARE EDUCATION/TRAINING PROGRAM

## 2023-08-01 PROCEDURE — 99214 OFFICE O/P EST MOD 30 MIN: CPT | Performed by: STUDENT IN AN ORGANIZED HEALTH CARE EDUCATION/TRAINING PROGRAM

## 2023-08-01 RX ORDER — LIDOCAINE 50 MG/G
1 PATCH TOPICAL EVERY 24 HOURS
Qty: 15 PATCH | Refills: 0 | Status: SHIPPED | OUTPATIENT
Start: 2023-08-01

## 2023-08-01 RX ORDER — TRAMADOL HYDROCHLORIDE 50 MG/1
50 TABLET ORAL EVERY 8 HOURS PRN
Qty: 21 TABLET | Refills: 0 | Status: SHIPPED
Start: 2023-08-01 | End: 2023-08-01

## 2023-08-01 RX ORDER — TRAMADOL HYDROCHLORIDE 50 MG/1
50 TABLET ORAL EVERY 8 HOURS PRN
Qty: 21 TABLET | Refills: 0 | Status: SHIPPED | OUTPATIENT
Start: 2023-08-01 | End: 2023-08-08

## 2023-08-01 NOTE — PROGRESS NOTES
"Subjective:     CC: Compression fracture    HPI:   Daphne presents today with    Problem   Compression Fracture of Lumbar Spine, Non-Traumatic, Initial Encounter ([facility])    This is a new condition.  Seen on x-ray 7/2023.  Age-indeterminate based on imaging.  She denies any weakness, numbness or tingling in the lower extremities.  She denies any loss of bowel or bladder function.  She denies any genital paresthesia.  She does have a sensation of crawling or water running down her right leg when she stands up but this goes away quickly.         ROS:  ROS    Objective:     Exam:  BP (!) 144/66   Pulse 77   Temp 36.4 °C (97.5 °F) (Temporal)   Resp 16   Ht 1.803 m (5' 11\")   Wt 68 kg (149 lb 14.6 oz)   SpO2 97%   BMI 20.91 kg/m²  Body mass index is 20.91 kg/m².    Physical Exam  Vitals reviewed.   Constitutional:       General: She is not in acute distress.     Appearance: She is not toxic-appearing.   HENT:      Head: Normocephalic and atraumatic.      Right Ear: External ear normal.      Left Ear: External ear normal.   Eyes:      General:         Right eye: No discharge.         Left eye: No discharge.      Extraocular Movements: Extraocular movements intact.      Conjunctiva/sclera: Conjunctivae normal.   Pulmonary:      Effort: Pulmonary effort is normal. No respiratory distress.   Skin:     General: Skin is warm and dry.   Neurological:      Mental Status: She is alert.      Comments: Strength is equal and intact bilaterally in the lower extremities.  Sensation is equal in the lower extremities bilaterally.   Psychiatric:         Mood and Affect: Mood normal.         Behavior: Behavior normal.         Thought Content: Thought content normal.         Judgment: Judgment normal.           Assessment & Plan:     89 y.o. female with the following -     1. Compression fracture of lumbar spine, non-traumatic, initial encounter ([facility])  We discussed that the main treatment is pain control.  Lidocaine patches sent.  " Patient is hesitant about using prescription pain medications but is concerned that pain will worsen.  Tramadol sent to pharmacy.  Opiate consent signed.  Discussed the risk of using opiates and may or may not pick these up from the pharmacy.  Referral to physiatry sent.  Discussed that if she does have any numbness, tingling, weakness in the lower extremities or loss of bowel or bladder function or genital anesthesia she needs to be seen in the emergency department immediately.  Patient understands.  She is seen with her daughter today who also understands.  - Referral to Pain Clinic  - Consent for Opiate Prescription  - lidocaine (LIDODERM) 5 % Patch; Place 1 Patch on the skin every 24 hours.  Dispense: 15 Patch; Refill: 0  - traMADol (ULTRAM) 50 MG Tab; Take 1 Tablet by mouth every 8 hours as needed for Severe Pain for up to 7 days.  Dispense: 21 Tablet; Refill: 0    2. Medication monitoring encounter  - URINE DRUG SCREEN W/CONF (AR); Future      No follow-ups on file.    Please note that this dictation was created using voice recognition software. I have made every reasonable attempt to correct obvious errors, but I expect that there are errors of grammar and possibly content that I did not discover before finalizing the note.

## 2023-08-03 LAB
AMPHET CTO UR CFM-MCNC: NEGATIVE NG/ML
BARBITURATES CTO UR CFM-MCNC: NEGATIVE NG/ML
BENZODIAZ CTO UR CFM-MCNC: NEGATIVE NG/ML
CANNABINOIDS CTO UR CFM-MCNC: NEGATIVE NG/ML
COCAINE CTO UR CFM-MCNC: NEGATIVE NG/ML
DRUG COMMENT 753798: NORMAL
METHADONE CTO UR CFM-MCNC: NEGATIVE NG/ML
OPIATES CTO UR CFM-MCNC: NEGATIVE NG/ML
PCP CTO UR CFM-MCNC: NEGATIVE NG/ML
PROPOXYPH CTO UR CFM-MCNC: NEGATIVE NG/ML

## 2023-09-21 ENCOUNTER — OFFICE VISIT (OUTPATIENT)
Dept: PHYSICAL MEDICINE AND REHAB | Facility: MEDICAL CENTER | Age: 88
End: 2023-09-21
Payer: MEDICARE

## 2023-09-21 VITALS
HEIGHT: 71 IN | DIASTOLIC BLOOD PRESSURE: 80 MMHG | OXYGEN SATURATION: 100 % | WEIGHT: 156.31 LBS | HEART RATE: 68 BPM | SYSTOLIC BLOOD PRESSURE: 138 MMHG | BODY MASS INDEX: 21.88 KG/M2 | TEMPERATURE: 97.3 F

## 2023-09-21 DIAGNOSIS — S32.040A COMPRESSION FRACTURE OF L4 VERTEBRA, INITIAL ENCOUNTER (HCC): ICD-10-CM

## 2023-09-21 DIAGNOSIS — Z91.81 RISK FOR FALLS: ICD-10-CM

## 2023-09-21 DIAGNOSIS — M80.00XA OSTEOPOROSIS WITH CURRENT PATHOLOGICAL FRACTURE, UNSPECIFIED OSTEOPOROSIS TYPE, INITIAL ENCOUNTER: ICD-10-CM

## 2023-09-21 DIAGNOSIS — M54.16 LUMBAR RADICULOPATHY: ICD-10-CM

## 2023-09-21 DIAGNOSIS — S32.010A COMPRESSION FRACTURE OF L1 VERTEBRA, INITIAL ENCOUNTER (HCC): ICD-10-CM

## 2023-09-21 DIAGNOSIS — M40.205 KYPHOSIS OF THORACOLUMBAR REGION, UNSPECIFIED KYPHOSIS TYPE: ICD-10-CM

## 2023-09-21 PROCEDURE — 3075F SYST BP GE 130 - 139MM HG: CPT | Performed by: PHYSICAL MEDICINE & REHABILITATION

## 2023-09-21 PROCEDURE — 99204 OFFICE O/P NEW MOD 45 MIN: CPT | Performed by: PHYSICAL MEDICINE & REHABILITATION

## 2023-09-21 PROCEDURE — 3079F DIAST BP 80-89 MM HG: CPT | Performed by: PHYSICAL MEDICINE & REHABILITATION

## 2023-09-21 PROCEDURE — 1125F AMNT PAIN NOTED PAIN PRSNT: CPT | Performed by: PHYSICAL MEDICINE & REHABILITATION

## 2023-09-21 ASSESSMENT — PAIN SCALES - GENERAL: PAINLEVEL: 4=SLIGHT-MODERATE PAIN

## 2023-09-21 ASSESSMENT — PATIENT HEALTH QUESTIONNAIRE - PHQ9: CLINICAL INTERPRETATION OF PHQ2 SCORE: 0

## 2023-09-21 NOTE — PROGRESS NOTES
New patient note    Interventional spine and Pain  Physiatry (physical medicine and  Rehabilitation)     Date of service: See epic    Chief complaint:   Chief Complaint   Patient presents with    New Patient     Lumbar compression fracture        Referring provider: Estefania Jiménez M.D.     HISTORY    HPI: Daphne Moss 89 y.o.  who presents today with Diagnoses of Lumbar radiculopathy, Compression fracture of L1 vertebra, initial encounter ([facility]), Compression fracture of L4 vertebra, initial encounter ([facility]), Osteoporosis with current pathological fracture, unspecified osteoporosis type, initial encounter, Kyphosis of thoracolumbar region, and Risk for falls were pertinent to this visit.    HPI    Starting in July 2023 the patient had acute onset severe pain in the right low back rating down the leg 8 out of 10 intensity constant.  The patient was seen in the emergency department after this.  X-rays were done.  The patient was seen by her primary care provider.  Over the past 2 months the patient's pain has been improving.  This is now a tingling sensation radiating down the anterolateral portion of the right lower extremity.  Back pain is improved.  Pain overall is 2 out of 10 intensity which is constant. Denies weakness.     Walks with a cane.       Medical records review:  I reviewed the note from the referring provider Estefania Jiménez M.D. including the note dated 8/1/2023.           ROS:   Red Flags ROS:   Fever, Chills, Sweats: Denies  Involuntary Weight Loss: Denies  Bladder Incontinence: Denies  Bowel Incontinence: denies  Saddle Anesthesia: Denies    All other systems reviewed and negative.       PMHx:   Past Medical History:   Diagnosis Date    Atrial fibrillation ([facility])          Current Outpatient Medications on File Prior to Visit   Medication Sig Dispense Refill    lidocaine (LIDODERM) 5 % Patch Place 1 Patch on the skin every 24 hours. 15 Patch 0    Cholecalciferol (D3 PO) Take 1 Capsule by mouth  every day.      Multiple Vitamins-Minerals (PRESERVISION AREDS PO) Take 1 Capsule by mouth 2 times a day.      atenolol (TENORMIN) 25 MG Tab Take 1 Tablet by mouth every day. 100 Tablet 3     No current facility-administered medications on file prior to visit.        PSHx:   History reviewed. No pertinent surgical history.    Family history   Family History   Problem Relation Age of Onset    Heart Disease Father     Hyperlipidemia Father     Heart Disease Brother     Heart Attack Paternal Grandfather          Medications: reviewed on epic.   Outpatient Medications Marked as Taking for the 23 encounter (Office Visit) with Parish Green M.D.   Medication Sig Dispense Refill    lidocaine (LIDODERM) 5 % Patch Place 1 Patch on the skin every 24 hours. 15 Patch 0    Cholecalciferol (D3 PO) Take 1 Capsule by mouth every day.      Multiple Vitamins-Minerals (PRESERVISION AREDS PO) Take 1 Capsule by mouth 2 times a day.      atenolol (TENORMIN) 25 MG Tab Take 1 Tablet by mouth every day. 100 Tablet 3        Allergies:   No Known Allergies    Social Hx:   Social History     Socioeconomic History    Marital status:      Spouse name: Not on file    Number of children: Not on file    Years of education: Not on file    Highest education level: Not on file   Occupational History    Not on file   Tobacco Use    Smoking status: Former     Current packs/day: 0.00     Average packs/day: 0.5 packs/day for 3.0 years (1.5 ttl pk-yrs)     Types: Cigarettes     Start date: 1949     Quit date: 1952     Years since quittin.7    Smokeless tobacco: Never   Vaping Use    Vaping Use: Never used   Substance and Sexual Activity    Alcohol use: Never    Drug use: Never    Sexual activity: Not Currently   Other Topics Concern    Not on file   Social History Narrative    [facility], has her own apartment, she doesn't drive, her friend helps her to grocery shopping and she has lunch at the [facility]  "blding where she lives, she could do all 3 meals if she wanted. She spends a lot of time reading.     Other daughter lives in [place], 1 here in [place].      Social Determinants of Health     Financial Resource Strain: Not on file   Food Insecurity: Not on file   Transportation Needs: Not on file   Physical Activity: Not on file   Stress: Not on file   Social Connections: Not on file   Intimate Partner Violence: Not on file   Housing Stability: Not on file         EXAMINATION     Physical Exam:   Vitals: /80 (BP Location: Right arm, Patient Position: Sitting, BP Cuff Size: Adult)   Pulse 68   Temp 36.3 °C (97.3 °F) (Temporal)   Ht 1.803 m (5' 11\")   Wt 70.9 kg (156 lb 4.9 oz)   SpO2 100%     Constitutional:   Body Habitus: Body mass index is 21.8 kg/m².  Cooperation: Fully cooperates with exam  Appearance: Well-groomed, well-nourished, not disheveled     Eyes: No scleral icterus to suggest severe liver disease, no proptosis to suggest severe hyperthyroid    ENT -no obvious auditory deficits, no obvious tongue lesions, tongue midline, no facial droop     Skin -no rashes or lesions noted     Respiratory-  breathing comfortable on room air, no audible wheezing    Cardiovascular- capillary refills less than 2 seconds.     Psychiatric- alert and oriented ×3. Normal affect.       Musculoskeletal and Neuro -           Thoracic/Lumbar Spine/Sacral Spine/Hips   Inspection: No evidence of atrophy in bilateral lower extremities throughout     ROM: decreased active range of motion with flexion, lateral flexion, and rotation bilaterally.   There is decreased active range of motion with lumbar extension.      Palpation:   No tenderness to palpation in midline at T1-T12 levels. No tenderness to palpation in the left and right of the midline T1-L5, NEGATIVE for tenderness to palpation to the para-midline region in the lower lumbar levels.  palpation over SI joint: negative bilaterally    palpation in hip or over the gluteus " medius tendon insertion: negative bilaterally      Lumbar spine Special tests  Neuro tension  Straight leg test negative bilaterally    Slump test negative bilaterally      HIP  FAIR test negative bilaterally    Range of motion in the RIGHT hip is full  in flexion, extension, abduction, internal rotation, external rotation.  Range of motion in the LEFT hip is full  in flexion, extension, abduction, internal rotation, external rotation.      SI joint tests  Observation patient sits on one buttocks: Negative  SI joint compression negative bilaterally    SI joint distraction negative bilaterally    Thigh thrust test negative bilaterally    TATYANA test negative bilaterally                 Key points for the international standards for neurological classification of spinal cord injury (ISNCSCI) to light touch.     Dermatome R L                                      L2 2 2   L3 2 2   L4 2 2   L5 2 2   S1 2 2   S2 2 2       Motor Exam Lower Extremities    ? Myotome R L   Hip flexion L2 5 5   Knee extension L3 5 5   Ankle dorsiflexion L4 5 5   Toe extension L5 5 5   Ankle plantarflexion S1 5 5         Reflexes  ?  R L                Patella  2+ 2+   Achilles   2+ 2+       Babinski sign negative bilaterally   Clonus of the ankle negative bilaterally       MEDICAL DECISION MAKING    Medical records review: see under HPI section.     DATA    Labs:   Lab Results   Component Value Date/Time    SODIUM 141 02/22/2023 08:54 AM    POTASSIUM 4.4 02/22/2023 08:54 AM    CHLORIDE 105 02/22/2023 08:54 AM    CO2 24 02/22/2023 08:54 AM    ANION 12.0 02/22/2023 08:54 AM    GLUCOSE 106 (H) 02/22/2023 08:54 AM    BUN 33 (H) 02/22/2023 08:54 AM    CREATININE 1.18 02/22/2023 08:54 AM    CALCIUM 9.3 02/22/2023 08:54 AM    ASTSGOT 24 02/22/2023 08:54 AM    ALTSGPT 14 02/22/2023 08:54 AM    TBILIRUBIN 0.7 02/22/2023 08:54 AM    ALBUMIN 4.1 02/22/2023 08:54 AM    TOTPROTEIN 6.9 02/22/2023 08:54 AM    GLOBULIN 2.8 02/22/2023 08:54 AM    AGRATIO 1.5  "02/22/2023 08:54 AM   ]    No results found for: \"PROTHROMBTM\", \"INR\"     Lab Results   Component Value Date/Time    WBC 7.3 04/30/2021 11:00 AM    RBC 4.43 04/30/2021 11:00 AM    HEMOGLOBIN 13.3 04/30/2021 11:00 AM    HEMATOCRIT 41.8 04/30/2021 11:00 AM    MCV 94.4 04/30/2021 11:00 AM    MCH 30.0 04/30/2021 11:00 AM    MCHC 31.8 (L) 04/30/2021 11:00 AM    MPV 12.4 04/30/2021 11:00 AM    NEUTSPOLYS 55.70 04/30/2021 11:00 AM    LYMPHOCYTES 31.70 04/30/2021 11:00 AM    MONOCYTES 8.60 04/30/2021 11:00 AM    EOSINOPHILS 2.70 04/30/2021 11:00 AM    BASOPHILS 0.80 04/30/2021 11:00 AM        Lab Results   Component Value Date/Time    HBA1C 5.5 04/30/2021 11:00 AM        Imaging:   I personally reviewed following images, these are my reads    X-ray lumbar spine 7/28/2023 with compression fractures at L1 and L4.  Degenerative changes in the lumbar spine.      IMAGING radiology reads. I reviewed the following radiology reads                                                                                        Results for orders placed in visit on 07/28/23    DX-LUMBAR SPINE-2 OR 3 VIEWS    Impression  1. Age indeterminate compression fractures at L1 and L4.  2. Severe diffuse degenerative changes throughout the lumbar spine, as above.  3. Mild levoscoliosis.                         Diagnosis   Visit Diagnoses     ICD-10-CM   1. Lumbar radiculopathy  M54.16   2. Compression fracture of L1 vertebra, initial encounter ([facility])  S32.010A   3. Compression fracture of L4 vertebra, initial encounter ([facility])  S32.040A   4. Osteoporosis with current pathological fracture, unspecified osteoporosis type, initial encounter  M80.00XA   5. Kyphosis of thoracolumbar region  M40.205   6. Risk for falls  Z91.81           ASSESSMENT AND PLAN:  Daphne Moss 89 y.o. female      Daphne was seen today for new patient.    Diagnoses and all orders for this visit:    Lumbar radiculopathy  -     Referral to Physical Therapy    Compression fracture of L1 " vertebra, initial encounter ([facility])  -     Referral to Physical Therapy    Compression fracture of L4 vertebra, initial encounter ([facility])  -     Referral to Physical Therapy    Osteoporosis with current pathological fracture, unspecified osteoporosis type, initial encounter  -     Referral to Physical Therapy    Kyphosis of thoracolumbar region  -     Referral to Physical Therapy    Risk for falls  -     Referral to Physical Therapy        Multiple compression fractures in the lumbar spine none of which are currently painful.    Lumbar radiculopathy stable.    The patient has no red flag signs on today's exam.  Specifically the patient has no saddle anesthesia, bowel incontinence, bladder incontinence.  We discussed emergency precautions. The patient understands emergency precautions.       Physical therapy: I ordered physical therapy to focus on strengthening and stretching as well as balance training.    Diagnostic studies: If the patient fails conservative treatments and I recommend an MRI lumbar spine without contrast.  If it has not been done, then I recommend DEXA    Medications:   Continue topical Lidoderm patches as needed.   Acetaminophen up to 1000 mg 3 times daily as needed not to exceed 3000 mg per 24-hours.    Interventional program:   Not indicated at this time    Referrals: I recommend a referral to endocrinology for management of osteoporosis with fracture      Follow-up: I am referring the patient back to the referring provider and PCP Estefania Jiménez M.D.           Please note that this dictation was created using voice recognition software. I have made every reasonable attempt to correct obvious errors but there may be errors of grammar and content that I may have overlooked prior to finalization of this note.      Parish Green MD  Physical Medicine and Rehabilitation  Interventional Spine and Sports Physiatry  [facility] Estefania Jiménez M.D.

## 2023-10-06 NOTE — OP THERAPY EVALUATION
Outpatient Physical Therapy  INITIAL EVALUATION    [facility]  [address]  [place] [address]  Phone:  [phone]  Fax:  [phone]    Date of Evaluation: 10/09/2023    Patient: Daphne Moss  YOB: 1928  MRN: [id number]     Referring Provider: Parish Green M.D.  [address]  [address]  [place] [address]   Referring Diagnosis Lumbar radiculopathy [M54.16];Compression fracture of L1 vertebra, initial encounter ([facility]) [S32.010A];Compression fracture of L4 vertebra, initial encounter ([facility]) [S32.040A];Osteoporosis with current pathological fracture, unspecified osteoporosis type, initial encounter [M80.00XA];Kyphosis of thoracolumbar region, unspecified kyphosis type [M40.205];Risk for falls [Z91.81]     Time Calculation  Start time: 1100  Stop time: 1150 Time Calculation (min): 50 minutes         Chief Complaint: Back Problem    Visit Diagnoses     ICD-10-CM   1. Lumbar radiculopathy  M54.16   2. Closed compression fracture of L1 lumbar vertebra, initial encounter ([facility])  S32.010A   3. Closed compression fracture of L4 lumbar vertebra, initial encounter ([facility])  S32.040A   4. Osteoporosis with current pathological fracture, unspecified osteoporosis type, initial encounter  M80.00XA   5. Kyphosis of thoracolumbar region, unspecified kyphosis type  M40.205   6. Risk for falls  Z91.81       Date of onset of impairment: No data found    Subjective:   History of Present Illness:     Mechanism of injury:  Pt is a 89 y.o female presenting to physical therapy with complaints of back pain, dx of compression fx at L1 (initial encounter), L4 (initial encounter), osteoporosis with current pathological fracture, and kyphosis of the thoracolumbar region. Pt has a PMH of Afib, stage 3 CKD, R sided sciatica, osteopenia, HTN, R hip pain. Pt reports that she currently does not have any pain in her back, notes that she doesn't know why she was  "scheduled for PT today. Pt/daughter notes she moved to [place] about 3 years ago, since then has had 3 falls. Notes she had one fall in the past 6 months but did not have any injury following the fall, notes she is able to get up by herself after falling also. Pt arrived today with daughter, Becky, who also states no real concerns other than her balance while walking with SPC. Notes she never used an AD until this July, and feels the SPC doesn't provide much support. Otherwise, daughter notes her mom gets around very well and has not been expressing concern for back pain/discomfort.     Per MD on 09/21/2023:\"Starting in July 2023 the patient had acute onset severe pain in the right low back rating down the leg 8 out of 10 intensity constant.  The patient was seen in the emergency department after this.  X-rays were done.  The patient was seen by her primary care provider.  Over the past 2 months the patient's pain has been improving.  This is now a tingling sensation radiating down the anterolateral portion of the right lower extremity.  Back pain is improved.  Pain overall is 2 out of 10 intensity which is constant. Denies weakness.      Walks with a cane.\"     Pt denies changes in bowel/bladder movements, saddle anesthesia, significant weight changes, chills/night sweats/fever, nausea and vomiting. Pt consents to evaluation and treatment today.    Quality of life:  Good  Sleep disturbance:  Not disrupted  Pain:     Pain Comments::  None currently  Social Support:     Lives in:  Community-based residential facility (community living with 3 meals provided a day. Pt reports they have a ton of activites to do, she participates in some. States she primarily uses the elevator to get up and down the 3rd floor)    Lives with:  Alone  Diagnostic Tests:     Diagnostic Tests Comments:  FROM 07/28/2023:     DX-LUMBAR SPINE-2 OR 3 VIEWS     Impression  1. Age indeterminate compression fractures at L1 and L4.  2. Severe diffuse " "degenerative changes throughout the lumbar spine, as above.  3. Mild levoscoliosis.  Activities of Daily Living:     Patient reported ADL status: Pt reported ADLs: IND with all ADLs other than meal prep due to area of living, showers and dresses in standing, no assistance required  Exercise: walking in hallway with SPC  Hobbies: attends some activities at community [place]    Assistive Devices:  SPC - has been using the cane since July  Patient Goals:     Other patient goals:  N/a      Past Medical History:   Diagnosis Date    Atrial fibrillation ([facility])      No past surgical history on file.  Social History     Tobacco Use    Smoking status: Former     Current packs/day: 0.00     Average packs/day: 0.5 packs/day for 3.0 years (1.5 ttl pk-yrs)     Types: Cigarettes     Start date: 1949     Quit date: 1952     Years since quittin.8    Smokeless tobacco: Never   Substance Use Topics    Alcohol use: Never     Family and Occupational History     Socioeconomic History    Marital status:      Spouse name: Not on file    Number of children: Not on file    Years of education: Not on file    Highest education level: Not on file   Occupational History    Not on file       Objective     Static Posture     Head  Forward.    Thoracic Spine  Hyperkyphosis.    Strength:      Lower extremities   Normal left lower extremity strength  Normal right lower extremity strength    General Comments     Spine Comments   STS: pt IND with transfer with sit<>stand with use of BUE, good eccentric control on descent    Gait with SPC: pt with very slow lara, decreased stride length, and increased fwd trunk, head lean. Pt with slow, shuffled steps with turning with cane spread out wide for \"increased support\"    Gait with 2ww: pt ambulated ~150 ft x3 laps with increased lara, improved stride length, however continues to ambulate with increased fwd trunk lean and downward head posture. Pt with improved balance and ease with " "turning in hallway with no reduction in gait speed. Notes improvement in balance with walker however notes she would \"have to get used to it\".    Balance:     Static standing : (FT): pt able to stand IND with appropriate sway and no LOB    Dynamic standing: (with AD): pt with no LOB and good-fair dynamic balance, improved with 2ww compared to SPC        Therapeutic Exercises (CPT 42465):     1. pt/family education, increased safety with 2ww/rolator compared to SPC    Therapeutic Treatments and Modalities:     1. Gait Training (CPT 28663), 150 ft x3 laps in hallway with 2ww    Time-based treatments/modalities:    Physical Therapy Timed Treatment Charges  Gait training minutes (CPT 12006): 15 minutes      Assessment, Response and Plan:   Impairments: abnormal gait    Assessment details:  Pt is a 89 y.o female presenting to physical therapy with complaints of back pain, dx of compression fx at L1 (initial encounter), L4 (initial encounter), osteoporosis with current pathological fracture, and kyphosis of the thoracolumbar region. Pt and daughter (tavo) arrived to clinic today not sure why appointment was made. Pt and daughter present with concerns of ambulation with SPC due to newness of using AD and worrisome of balance during ambulation. Pt ambulates with decreased lara, short stride length, shuffled steps, increased fwd trunk and head lean and very slowly navigates turns. Pt trial 2ww during evaluation and had significant improvement in stride length, increased lara, and overall better confidence with ambulation and navigating turns. Educated family on use of 2ww versus SPC for increased stability during ambulation and purpose of using cane versus walker. Pt/Daughter agreeable on considering purchasing 2ww/rolator.     Ultimately, pt was not experiencing any pain in the low back and did not want to participate in skilled physical therapy services. Daughter also did not express any concerns. Pt is appropriate " to d/c as she demos good BLE strength, good static and dynamic balance.  Pt and daughter provided with therapist card if any questions/concerns arise. Pt is appropriate for d/c today.   Barriers to therapy:  Age  Goals:   Short Term Goals:   N/a Due to discharge      Long Term Goals:    N/a Due to discharge    Plan:   Therapy options:  Discharged due to patient/family choice  Discussed with:  Patient and family      Functional Assessment Used  Sky Marcel Low Back Pain and Disability Score: 0     Referring provider co-signature:  I have reviewed this plan of care and my co-signature certifies the need for services.    Certification Period: 10/09/2023     Physician Signature: ________________________________ Date: ______________

## 2023-10-09 ENCOUNTER — PHYSICAL THERAPY (OUTPATIENT)
Dept: PHYSICAL THERAPY | Facility: MEDICAL CENTER | Age: 88
End: 2023-10-09
Attending: PHYSICAL MEDICINE & REHABILITATION
Payer: MEDICARE

## 2023-10-09 DIAGNOSIS — M54.16 LUMBAR RADICULOPATHY: ICD-10-CM

## 2023-10-09 DIAGNOSIS — S32.040A CLOSED COMPRESSION FRACTURE OF L4 LUMBAR VERTEBRA, INITIAL ENCOUNTER (HCC): ICD-10-CM

## 2023-10-09 DIAGNOSIS — S32.010A CLOSED COMPRESSION FRACTURE OF L1 LUMBAR VERTEBRA, INITIAL ENCOUNTER (HCC): ICD-10-CM

## 2023-10-09 DIAGNOSIS — M40.205 KYPHOSIS OF THORACOLUMBAR REGION, UNSPECIFIED KYPHOSIS TYPE: ICD-10-CM

## 2023-10-09 DIAGNOSIS — M80.00XA OSTEOPOROSIS WITH CURRENT PATHOLOGICAL FRACTURE, UNSPECIFIED OSTEOPOROSIS TYPE, INITIAL ENCOUNTER: ICD-10-CM

## 2023-10-09 DIAGNOSIS — Z91.81 RISK FOR FALLS: ICD-10-CM

## 2023-10-09 PROCEDURE — 97162 PT EVAL MOD COMPLEX 30 MIN: CPT

## 2023-10-09 PROCEDURE — 97116 GAIT TRAINING THERAPY: CPT

## 2023-10-09 SDOH — ECONOMIC STABILITY: GENERAL: QUALITY OF LIFE: GOOD

## 2023-10-09 ASSESSMENT — ENCOUNTER SYMPTOMS: ADDITIONAL INFORMATION: NONE CURRENTLY

## 2023-10-11 ENCOUNTER — APPOINTMENT (OUTPATIENT)
Dept: PHYSICAL THERAPY | Facility: MEDICAL CENTER | Age: 88
End: 2023-10-11
Attending: PHYSICAL MEDICINE & REHABILITATION
Payer: MEDICARE

## 2023-10-16 ENCOUNTER — APPOINTMENT (OUTPATIENT)
Dept: PHYSICAL THERAPY | Facility: MEDICAL CENTER | Age: 88
End: 2023-10-16
Attending: PHYSICAL MEDICINE & REHABILITATION
Payer: MEDICARE

## 2024-01-25 ENCOUNTER — OFFICE VISIT (OUTPATIENT)
Dept: MEDICAL GROUP | Facility: MEDICAL CENTER | Age: 89
End: 2024-01-25
Payer: MEDICARE

## 2024-01-25 VITALS
OXYGEN SATURATION: 97 % | BODY MASS INDEX: 21.42 KG/M2 | TEMPERATURE: 97.3 F | HEIGHT: 71 IN | DIASTOLIC BLOOD PRESSURE: 72 MMHG | WEIGHT: 153 LBS | SYSTOLIC BLOOD PRESSURE: 134 MMHG | HEART RATE: 64 BPM

## 2024-01-25 DIAGNOSIS — H61.23 BILATERAL IMPACTED CERUMEN: ICD-10-CM

## 2024-01-25 DIAGNOSIS — H91.93 BILATERAL HEARING LOSS, UNSPECIFIED HEARING LOSS TYPE: ICD-10-CM

## 2024-01-25 PROCEDURE — 3075F SYST BP GE 130 - 139MM HG: CPT | Performed by: STUDENT IN AN ORGANIZED HEALTH CARE EDUCATION/TRAINING PROGRAM

## 2024-01-25 PROCEDURE — 99214 OFFICE O/P EST MOD 30 MIN: CPT | Performed by: STUDENT IN AN ORGANIZED HEALTH CARE EDUCATION/TRAINING PROGRAM

## 2024-01-25 PROCEDURE — 3078F DIAST BP <80 MM HG: CPT | Performed by: STUDENT IN AN ORGANIZED HEALTH CARE EDUCATION/TRAINING PROGRAM

## 2024-01-25 ASSESSMENT — PATIENT HEALTH QUESTIONNAIRE - PHQ9: CLINICAL INTERPRETATION OF PHQ2 SCORE: 0

## 2024-01-25 NOTE — PROGRESS NOTES
"Subjective:     CC: Impacted cerumen, hard of hearing    HPI:   Daphne presents today with    Problem   Bilateral Impacted Cerumen    This is a chronic condition.  She was previously getting flushes every 6 months but was having difficulties with her ENT doctor completing this.  Hoping for a new referral and also hoping for possible wax removal today       Hard of Hearing    This is a chronic condition.  Patient does have hearing aids.  She is looking for an audiologist.         ROS:  ROS    Objective:     Exam:  /72   Pulse 64   Temp 36.3 °C (97.3 °F)   Ht 1.803 m (5' 11\")   Wt 69.4 kg (153 lb)   SpO2 97%   BMI 21.34 kg/m²  Body mass index is 21.34 kg/m².    Physical Exam  Vitals reviewed.   Constitutional:       General: She is not in acute distress.     Appearance: She is not toxic-appearing.   HENT:      Head: Normocephalic and atraumatic.      Right Ear: External ear normal. There is impacted cerumen.      Left Ear: External ear normal. There is impacted cerumen.   Eyes:      General:         Right eye: No discharge.         Left eye: No discharge.      Extraocular Movements: Extraocular movements intact.      Conjunctiva/sclera: Conjunctivae normal.   Cardiovascular:      Rate and Rhythm: Normal rate and regular rhythm.      Heart sounds: Normal heart sounds. No murmur heard.  Pulmonary:      Effort: Pulmonary effort is normal. No respiratory distress.      Breath sounds: Normal breath sounds. No wheezing or rales.   Skin:     General: Skin is warm and dry.   Neurological:      Mental Status: She is alert.   Psychiatric:         Mood and Affect: Mood normal.         Behavior: Behavior normal.         Thought Content: Thought content normal.         Judgment: Judgment normal.           Assessment & Plan:     89 y.o. female with the following -     1. Bilateral impacted cerumen  Chronic condition, MA ear irrigation, referral to ENT sent  - Referral to ENT  - Ear Irrigation ([place] Only); Future    2. " Bilateral hearing loss, unspecified hearing loss type  Chronic condition, referral to audiology sent  - Referral to Audiology    [facility] Form    Diagnosis to address: N18.31 - Stage 3a chronic kidney disease ([facility])  Assessment and plan: Chronic, stable. Continue with current defined treatment plan: monitor. Follow-up at least annually.  Diagnosis: I48.11 - Longstanding persistent atrial fibrillation ([facility])  Assessment and plan: Chronic, stable. Continue with current defined treatment plan: atenolol. Follow-up at least annually.  Diagnosis: I70.0 - Atherosclerosis of aorta ([facility])  Assessment and plan: Chronic, stable. Continue with current defined treatment plan: monitor, control BP. Follow-up at least annually.  Diagnosis: N18.32 - Chronic kidney disease, stage 3b ([facility])  Assessment and plan: Chronic, stable. Continue with current defined treatment plan: monitor. Follow-up at least annually.  Last edited 01/25/24 10:12 PST by Estefania Jiménez M.D.           No follow-ups on file.    Please note that this dictation was created using voice recognition software. I have made every reasonable attempt to correct obvious errors, but I expect that there are errors of grammar and possibly content that I did not discover before finalizing the note.

## 2024-03-15 ENCOUNTER — TELEPHONE (OUTPATIENT)
Dept: HEALTH INFORMATION MANAGEMENT | Facility: OTHER | Age: 89
End: 2024-03-15

## 2024-05-10 ENCOUNTER — APPOINTMENT (OUTPATIENT)
Dept: MEDICAL GROUP | Facility: MEDICAL CENTER | Age: 89
End: 2024-05-10
Payer: MEDICARE

## 2024-05-10 VITALS
BODY MASS INDEX: 21.05 KG/M2 | SYSTOLIC BLOOD PRESSURE: 122 MMHG | OXYGEN SATURATION: 96 % | HEIGHT: 70 IN | DIASTOLIC BLOOD PRESSURE: 64 MMHG | WEIGHT: 147 LBS | TEMPERATURE: 97.6 F | RESPIRATION RATE: 20 BRPM

## 2024-05-10 DIAGNOSIS — R32 URINARY INCONTINENCE, UNSPECIFIED TYPE: ICD-10-CM

## 2024-05-10 DIAGNOSIS — H61.21 IMPACTED CERUMEN OF RIGHT EAR: ICD-10-CM

## 2024-05-10 PROCEDURE — 3074F SYST BP LT 130 MM HG: CPT | Performed by: STUDENT IN AN ORGANIZED HEALTH CARE EDUCATION/TRAINING PROGRAM

## 2024-05-10 PROCEDURE — 3078F DIAST BP <80 MM HG: CPT | Performed by: STUDENT IN AN ORGANIZED HEALTH CARE EDUCATION/TRAINING PROGRAM

## 2024-05-10 PROCEDURE — 99214 OFFICE O/P EST MOD 30 MIN: CPT | Performed by: STUDENT IN AN ORGANIZED HEALTH CARE EDUCATION/TRAINING PROGRAM

## 2024-05-10 NOTE — PROGRESS NOTES
"Subjective:     CC: impacted cerumen, incontinence    HPI:   Daphne presents today with     Recurrent cerumen impaction.  She was told by audiology that she needs to get her ears flushed.  She is hoping for evaluation today.    She also has a history of incontinence.  During the day this is okay but she will notice it overnight.  She does wear a pad overnight.  She is wonder if there is anything that can be done about incontinence.    ROS:  ROS    Objective:     Exam:  /64 (BP Location: Left arm, Patient Position: Sitting, BP Cuff Size: Adult)   Temp 36.4 °C (97.6 °F) (Temporal)   Resp 20   Ht 1.778 m (5' 10\")   Wt 66.7 kg (147 lb)   SpO2 96%   BMI 21.09 kg/m²  Body mass index is 21.09 kg/m².    Physical Exam  Vitals reviewed.   Constitutional:       General: She is not in acute distress.     Appearance: She is not toxic-appearing.   HENT:      Head: Normocephalic and atraumatic.      Right Ear: External ear normal. There is impacted cerumen.      Left Ear: Tympanic membrane, ear canal and external ear normal.   Eyes:      General:         Right eye: No discharge.         Left eye: No discharge.      Extraocular Movements: Extraocular movements intact.      Conjunctiva/sclera: Conjunctivae normal.   Pulmonary:      Effort: Pulmonary effort is normal. No respiratory distress.   Skin:     General: Skin is warm and dry.   Neurological:      Mental Status: She is alert.   Psychiatric:         Mood and Affect: Mood normal.         Behavior: Behavior normal.         Thought Content: Thought content normal.         Judgment: Judgment normal.           Assessment & Plan:     89 y.o. female with the following -     1. Urinary incontinence, unspecified type  We discussed that this can be managed through pelvic floor physical therapy or medications.  We discussed that medications do come with her own risk of anticholinergic side effects.  Patient would like to start with physical therapy.  That referral has been " placed.  - Referral to Physical Therapy    2. Impacted cerumen of right ear  This is a recurrent condition.  Her right ear is impacted today and she will get an ear irrigation on that side.  We did discuss that she can use over-the-counter Debrox drops to help soften her earwax.  - Ear Irrigation ([facility] Only); Future        No follow-ups on file.    Please note that this dictation was created using voice recognition software. I have made every reasonable attempt to correct obvious errors, but I expect that there are errors of grammar and possibly content that I did not discover before finalizing the note.

## 2024-05-30 ENCOUNTER — OFFICE VISIT (OUTPATIENT)
Dept: MEDICAL GROUP | Facility: MEDICAL CENTER | Age: 89
End: 2024-05-30
Payer: MEDICARE

## 2024-05-30 VITALS
HEART RATE: 55 BPM | RESPIRATION RATE: 16 BRPM | WEIGHT: 146.2 LBS | TEMPERATURE: 96.9 F | BODY MASS INDEX: 20.93 KG/M2 | OXYGEN SATURATION: 96 % | HEIGHT: 70 IN | DIASTOLIC BLOOD PRESSURE: 60 MMHG | SYSTOLIC BLOOD PRESSURE: 140 MMHG

## 2024-05-30 DIAGNOSIS — H61.21 IMPACTED CERUMEN OF RIGHT EAR: ICD-10-CM

## 2024-05-30 NOTE — PROGRESS NOTES
"Subjective:     CC: Active cerumen right ear    HPI:   Daphne presents today with a recurrent issue that is currently active.  She has impacted cerumen on the right side.  She was here about 3 weeks ago and we tried to flush that side.  We are unable to get all of it out and sent her home with a prescription for Debrox drops.  She has been using these for about 3 weeks and would like to get rechecked and flushed if appropriate.    ROS:  ROS    Objective:     Exam:  BP (!) 140/60 (BP Location: Left arm, Patient Position: Sitting, BP Cuff Size: Adult)   Pulse (!) 55   Temp 36.1 °C (96.9 °F) (Temporal)   Resp 16   Ht 1.778 m (5' 10\")   Wt 66.3 kg (146 lb 3.2 oz)   SpO2 96%   BMI 20.98 kg/m²  Body mass index is 20.98 kg/m².    Physical Exam  Vitals reviewed.   Constitutional:       General: She is not in acute distress.     Appearance: She is not toxic-appearing.   HENT:      Head: Normocephalic and atraumatic.      Right Ear: External ear normal. There is impacted cerumen.      Left Ear: Tympanic membrane, ear canal and external ear normal.   Eyes:      General:         Right eye: No discharge.         Left eye: No discharge.      Extraocular Movements: Extraocular movements intact.      Conjunctiva/sclera: Conjunctivae normal.   Pulmonary:      Effort: Pulmonary effort is normal. No respiratory distress.   Skin:     General: Skin is warm and dry.   Neurological:      Mental Status: She is alert.   Psychiatric:         Mood and Affect: Mood normal.         Behavior: Behavior normal.         Thought Content: Thought content normal.         Judgment: Judgment normal.           Assessment & Plan:     89 y.o. female with the following -   1. Impacted cerumen of right ear  Continue the use of Debrox drops, [place] ear irrigation today.  [facility] ear irrigation was unable to get out the full amount of wax so I followed up with a curette and was able to remove impacted cerumen  - Ear Irrigation ([facility] Only); Future    No follow-ups on " file.    Please note that this dictation was created using voice recognition software. I have made every reasonable attempt to correct obvious errors, but I expect that there are errors of grammar and possibly content that I did not discover before finalizing the note.

## 2024-05-30 NOTE — PROCEDURES
Ear Wax Removal    Date/Time: 5/30/2024 11:06 AM    Performed by: Estefania Jiménez M.D.  Authorized by: Estefania Jiménez M.D.    Anesthesia:  Local Anesthetic: none  Location details: right ear  Patient tolerance: patient tolerated the procedure well with no immediate complications  Procedure type: curette   Sedation:  Patient sedated: no

## 2024-06-25 ENCOUNTER — PATIENT MESSAGE (OUTPATIENT)
Dept: MEDICAL GROUP | Facility: MEDICAL CENTER | Age: 89
End: 2024-06-25
Payer: MEDICARE

## 2024-06-25 DIAGNOSIS — I48.11 LONGSTANDING PERSISTENT ATRIAL FIBRILLATION (HCC): ICD-10-CM

## 2024-06-25 RX ORDER — ATENOLOL 25 MG/1
25 TABLET ORAL DAILY
Qty: 100 TABLET | Refills: 3 | Status: SHIPPED | OUTPATIENT
Start: 2024-06-25 | End: 2024-06-27 | Stop reason: SDUPTHER

## 2024-06-27 ENCOUNTER — PATIENT MESSAGE (OUTPATIENT)
Dept: MEDICAL GROUP | Facility: MEDICAL CENTER | Age: 89
End: 2024-06-27
Payer: MEDICARE

## 2024-06-27 DIAGNOSIS — I48.11 LONGSTANDING PERSISTENT ATRIAL FIBRILLATION (HCC): ICD-10-CM

## 2024-06-27 RX ORDER — ATENOLOL 25 MG/1
25 TABLET ORAL DAILY
Qty: 100 TABLET | Refills: 3 | Status: SHIPPED | OUTPATIENT
Start: 2024-06-27

## 2024-09-24 PROBLEM — I70.0 ATHEROSCLEROSIS OF AORTA (HCC): Status: ACTIVE | Noted: 2024-09-24

## 2025-01-30 ENCOUNTER — APPOINTMENT (OUTPATIENT)
Dept: MEDICAL GROUP | Facility: MEDICAL CENTER | Age: OVER 89
End: 2025-01-30
Payer: MEDICARE

## 2025-01-31 ENCOUNTER — OFFICE VISIT (OUTPATIENT)
Dept: MEDICAL GROUP | Facility: MEDICAL CENTER | Age: OVER 89
End: 2025-01-31
Payer: MEDICARE

## 2025-01-31 VITALS
OXYGEN SATURATION: 97 % | HEART RATE: 58 BPM | DIASTOLIC BLOOD PRESSURE: 58 MMHG | HEIGHT: 70 IN | SYSTOLIC BLOOD PRESSURE: 116 MMHG | BODY MASS INDEX: 20.9 KG/M2 | TEMPERATURE: 96.9 F | WEIGHT: 146 LBS

## 2025-01-31 DIAGNOSIS — R10.9 RIGHT FLANK PAIN: ICD-10-CM

## 2025-01-31 DIAGNOSIS — H61.21 IMPACTED CERUMEN OF RIGHT EAR: ICD-10-CM

## 2025-01-31 NOTE — PROGRESS NOTES
"Subjective:     CC: Right flank pain, earwax    Verbal consent was acquired by the patient to use [employer] ambient listening note generation during this visit Yes     HPI:   Daphne presents today with    History of Present Illness  The patient presents for right-sided pain and cerumen impaction.    They have experienced right-sided discomfort up to their waist for 1 month, severe when seated or sleeping. No renal history. Pain has subsided since Wednesday with analgesics, and they report no pain this morning. Swelling sensation resolved. Normal urinary function, no dysuria or frequency changes. Concerned about recurrence.    No ear exam for cerumen impaction in 6 months. No Debrox drops at home.    Using prunes for the past couple of months, beneficial.      ROS:  ROS    Objective:     Exam:  /58 (BP Location: Left arm, Patient Position: Sitting, BP Cuff Size: Adult)   Pulse (!) 58   Temp 36.1 °C (96.9 °F) (Temporal)   Ht 1.778 m (5' 10\")   Wt 66.2 kg (146 lb)   SpO2 97%   BMI 20.95 kg/m²  Body mass index is 20.95 kg/m².    Physical Exam  Vitals reviewed.   Constitutional:       General: She is not in acute distress.     Appearance: She is not toxic-appearing.   HENT:      Head: Normocephalic and atraumatic.      Right Ear: External ear normal.      Left Ear: External ear normal.   Eyes:      General:         Right eye: No discharge.         Left eye: No discharge.      Extraocular Movements: Extraocular movements intact.      Conjunctiva/sclera: Conjunctivae normal.   Cardiovascular:      Rate and Rhythm: Normal rate and regular rhythm.      Heart sounds: Normal heart sounds.   Pulmonary:      Effort: Pulmonary effort is normal. No respiratory distress.      Breath sounds: Normal breath sounds. No wheezing or rales.   Skin:     General: Skin is warm and dry.   Neurological:      Mental Status: She is alert.   Psychiatric:         Mood and Affect: Mood normal.         Behavior: Behavior normal.         " Thought Content: Thought content normal.         Judgment: Judgment normal.           Assessment & Plan:     89 y.o. female with the following -     Assessment & Plan  1. Right-sided pain  - Differential includes renal infection, nephrolithiasis, or renal cyst/mass  - Pain resolved this morning  - Orders for fasting blood work, urine tests, and renal ultrasound if pain recurs    2. Cerumen impaction  - Right ear 75% occluded with cerumen  - Aural irrigation today  - Use Debrox drops at home to prevent future accumulation    PROCEDURE  Aural irrigation today.    1. Right flank pain  URINALYSIS    URINE CULTURE(NEW)    Basic Metabolic Panel    US-RENAL      2. Impacted cerumen of right ear  Ear Irrigation ([facility] Only)        HCC Gap Form    Diagnosis to address: I48.11 - Longstanding persistent atrial fibrillation (HCC)  Assessment and plan: Chronic, stable. Continue with current defined treatment plan: Atenolol. Follow-up at least annually.  Diagnosis: N18.31 - Stage 3a chronic kidney disease  Assessment and plan: Chronic, stable. Continue with current defined treatment plan: Monitor. Follow-up at least annually.  Last edited 01/31/25 13:32 PST by Estefania Jiménez M.D.             No follow-ups on file.    Please note that this dictation was created using voice recognition software. I have made every reasonable attempt to correct obvious errors, but I expect that there are errors of grammar and possibly content that I did not discover before finalizing the note.

## 2025-05-24 DIAGNOSIS — I48.11 LONGSTANDING PERSISTENT ATRIAL FIBRILLATION (HCC): ICD-10-CM

## 2025-05-27 RX ORDER — ATENOLOL 25 MG/1
25 TABLET ORAL DAILY
Qty: 100 TABLET | Refills: 2 | Status: SHIPPED | OUTPATIENT
Start: 2025-05-27

## 2025-08-28 ENCOUNTER — PATIENT MESSAGE (OUTPATIENT)
Dept: MEDICAL GROUP | Facility: MEDICAL CENTER | Age: OVER 89
End: 2025-08-28
Payer: MEDICARE